# Patient Record
Sex: FEMALE | Race: WHITE | NOT HISPANIC OR LATINO
[De-identification: names, ages, dates, MRNs, and addresses within clinical notes are randomized per-mention and may not be internally consistent; named-entity substitution may affect disease eponyms.]

---

## 2017-01-12 ENCOUNTER — MEDICATION RENEWAL (OUTPATIENT)
Age: 67
End: 2017-01-12

## 2017-04-25 ENCOUNTER — FORM ENCOUNTER (OUTPATIENT)
Age: 67
End: 2017-04-25

## 2017-04-26 ENCOUNTER — APPOINTMENT (OUTPATIENT)
Dept: RADIOLOGY | Facility: CLINIC | Age: 67
End: 2017-04-26

## 2017-04-26 ENCOUNTER — OUTPATIENT (OUTPATIENT)
Dept: OUTPATIENT SERVICES | Facility: HOSPITAL | Age: 67
LOS: 1 days | End: 2017-04-26
Payer: COMMERCIAL

## 2017-04-26 DIAGNOSIS — Z00.8 ENCOUNTER FOR OTHER GENERAL EXAMINATION: ICD-10-CM

## 2017-04-26 PROCEDURE — 73502 X-RAY EXAM HIP UNI 2-3 VIEWS: CPT

## 2017-04-26 PROCEDURE — 77080 DXA BONE DENSITY AXIAL: CPT

## 2017-08-29 ENCOUNTER — RX RENEWAL (OUTPATIENT)
Age: 67
End: 2017-08-29

## 2017-09-17 ENCOUNTER — RX RENEWAL (OUTPATIENT)
Age: 67
End: 2017-09-17

## 2017-10-24 ENCOUNTER — FORM ENCOUNTER (OUTPATIENT)
Age: 67
End: 2017-10-24

## 2017-10-25 ENCOUNTER — APPOINTMENT (OUTPATIENT)
Dept: MRI IMAGING | Facility: CLINIC | Age: 67
End: 2017-10-25
Payer: COMMERCIAL

## 2017-10-25 ENCOUNTER — OUTPATIENT (OUTPATIENT)
Dept: OUTPATIENT SERVICES | Facility: HOSPITAL | Age: 67
LOS: 1 days | End: 2017-10-25
Payer: COMMERCIAL

## 2017-10-25 DIAGNOSIS — Z00.8 ENCOUNTER FOR OTHER GENERAL EXAMINATION: ICD-10-CM

## 2017-10-25 DIAGNOSIS — M54.12 RADICULOPATHY, CERVICAL REGION: ICD-10-CM

## 2017-10-25 PROCEDURE — 72141 MRI NECK SPINE W/O DYE: CPT | Mod: 26

## 2017-10-25 PROCEDURE — 72141 MRI NECK SPINE W/O DYE: CPT

## 2018-01-24 ENCOUNTER — FORM ENCOUNTER (OUTPATIENT)
Age: 68
End: 2018-01-24

## 2018-01-25 ENCOUNTER — OUTPATIENT (OUTPATIENT)
Dept: OUTPATIENT SERVICES | Facility: HOSPITAL | Age: 68
LOS: 1 days | End: 2018-01-25
Payer: COMMERCIAL

## 2018-01-25 ENCOUNTER — APPOINTMENT (OUTPATIENT)
Dept: MRI IMAGING | Facility: CLINIC | Age: 68
End: 2018-01-25
Payer: COMMERCIAL

## 2018-01-25 DIAGNOSIS — Z00.8 ENCOUNTER FOR OTHER GENERAL EXAMINATION: ICD-10-CM

## 2018-01-25 DIAGNOSIS — M25.561 PAIN IN RIGHT KNEE: ICD-10-CM

## 2018-01-25 PROCEDURE — 73721 MRI JNT OF LWR EXTRE W/O DYE: CPT | Mod: 26,RT

## 2018-01-25 PROCEDURE — 73721 MRI JNT OF LWR EXTRE W/O DYE: CPT

## 2018-01-26 ENCOUNTER — APPOINTMENT (OUTPATIENT)
Dept: FAMILY MEDICINE | Facility: CLINIC | Age: 68
End: 2018-01-26
Payer: COMMERCIAL

## 2018-01-26 VITALS
BODY MASS INDEX: 27.47 KG/M2 | HEART RATE: 64 BPM | OXYGEN SATURATION: 98 % | DIASTOLIC BLOOD PRESSURE: 68 MMHG | SYSTOLIC BLOOD PRESSURE: 110 MMHG | HEIGHT: 67 IN | RESPIRATION RATE: 16 BRPM | WEIGHT: 175 LBS

## 2018-01-26 DIAGNOSIS — Z87.2 PERSONAL HISTORY OF DISEASES OF THE SKIN AND SUBCUTANEOUS TISSUE: ICD-10-CM

## 2018-01-26 PROCEDURE — 20610 DRAIN/INJ JOINT/BURSA W/O US: CPT

## 2018-01-26 PROCEDURE — 99213 OFFICE O/P EST LOW 20 MIN: CPT | Mod: 25

## 2018-01-26 RX ADMIN — METHYLPREDNISOLONE ACETATE 0 MG/ML: 40 INJECTION, SUSPENSION INTRA-ARTICULAR; INTRALESIONAL; INTRAMUSCULAR; SOFT TISSUE at 00:00

## 2018-01-27 RX ORDER — METHYLPRED ACET/NACL,ISO-OS/PF 40 MG/ML
40 VIAL (ML) INJECTION
Qty: 1 | Refills: 0 | Status: COMPLETED | OUTPATIENT
Start: 2018-01-26

## 2018-06-05 ENCOUNTER — APPOINTMENT (OUTPATIENT)
Dept: FAMILY MEDICINE | Facility: CLINIC | Age: 68
End: 2018-06-05
Payer: COMMERCIAL

## 2018-06-05 VITALS — DIASTOLIC BLOOD PRESSURE: 84 MMHG | SYSTOLIC BLOOD PRESSURE: 138 MMHG | HEART RATE: 64 BPM

## 2018-06-05 DIAGNOSIS — M25.561 PAIN IN RIGHT KNEE: ICD-10-CM

## 2018-06-05 DIAGNOSIS — Z96.649 PRESENCE OF UNSPECIFIED ARTIFICIAL HIP JOINT: ICD-10-CM

## 2018-06-05 DIAGNOSIS — M16.10 UNILATERAL PRIMARY OSTEOARTHRITIS, UNSPECIFIED HIP: ICD-10-CM

## 2018-06-05 DIAGNOSIS — M25.461 EFFUSION, RIGHT KNEE: ICD-10-CM

## 2018-06-05 PROCEDURE — 36415 COLL VENOUS BLD VENIPUNCTURE: CPT

## 2018-06-05 PROCEDURE — 99397 PER PM REEVAL EST PAT 65+ YR: CPT | Mod: 25

## 2018-06-05 RX ORDER — MELOXICAM 15 MG/1
15 TABLET ORAL
Qty: 30 | Refills: 2 | Status: DISCONTINUED | COMMUNITY
Start: 2018-01-08 | End: 2018-06-05

## 2018-06-05 NOTE — HEALTH RISK ASSESSMENT
[Very Good] : ~his/her~  mood as very good [] : No [No falls in past year] : Patient reported no falls in the past year [0] : 2) Feeling down, depressed, or hopeless: Not at all (0) [de-identified] : occassionaly [HIV Test offered] : HIV Test offered [Hepatitis C test offered] : Hepatitis C test offered [Change in mental status noted] : No change in mental status noted [None] : None [With Family] : lives with family [College] : College [] :  [# Of Children ___] : has [unfilled] children [Sexually Active] : sexually active [High Risk Behavior] : no high risk behavior [Fully functional (bathing, dressing, toileting, transferring, walking, feeding)] : Fully functional (bathing, dressing, toileting, transferring, walking, feeding) [Fully functional (using the telephone, shopping, preparing meals, housekeeping, doing laundry, using] : Fully functional and needs no help or supervision to perform IADLs (using the telephone, shopping, preparing meals, housekeeping, doing laundry, using transportation, managing medications and managing finances) [Reports changes in hearing] : Reports no changes in hearing [Reports changes in vision] : Reports no changes in vision [MammogramDate] : 2016 [BoneDensityDate] : 2017 [ColonoscopyDate] : 2012 [Aggressive treatment] : aggressive treatment

## 2018-06-05 NOTE — PHYSICAL EXAM
[No Acute Distress] : no acute distress [Well-Appearing] : well-appearing [Normal Oropharynx] : the oropharynx was normal [No JVD] : no jugular venous distention [No Lymphadenopathy] : no lymphadenopathy [Clear to Auscultation] : lungs were clear to auscultation bilaterally [Regular Rhythm] : with a regular rhythm [No Murmur] : no murmur heard [No Carotid Bruits] : no carotid bruits [Pedal Pulses Present] : the pedal pulses are present [No Edema] : there was no peripheral edema [Soft] : abdomen soft [No Masses] : no abdominal mass palpated [No HSM] : no HSM [Normal Supraclavicular Nodes] : no supraclavicular lymphadenopathy [Normal Axillary Nodes] : no axillary lymphadenopathy [Normal Posterior Cervical Nodes] : no posterior cervical lymphadenopathy [Normal Anterior Cervical Nodes] : no anterior cervical lymphadenopathy [Normal Inguinal Nodes] : no inguinal lymphadenopathy [No Spinal Tenderness] : no spinal tenderness [Normal Gait] : normal gait [Normal Insight/Judgement] : insight and judgment were intact [de-identified] : mild DJD changes of both knees

## 2018-06-06 VITALS — WEIGHT: 173 LBS | HEIGHT: 67 IN | BODY MASS INDEX: 27.15 KG/M2

## 2018-06-06 LAB
25(OH)D3 SERPL-MCNC: 27.4 NG/ML
ALBUMIN SERPL ELPH-MCNC: 4.4 G/DL
ALP BLD-CCNC: 62 U/L
ALT SERPL-CCNC: 29 U/L
ANION GAP SERPL CALC-SCNC: 17 MMOL/L
AST SERPL-CCNC: 31 U/L
BASOPHILS # BLD AUTO: 0.03 K/UL
BASOPHILS NFR BLD AUTO: 0.5 %
BILIRUB SERPL-MCNC: 1.2 MG/DL
BUN SERPL-MCNC: 18 MG/DL
CALCIUM SERPL-MCNC: 10.1 MG/DL
CHLORIDE SERPL-SCNC: 100 MMOL/L
CHOLEST SERPL-MCNC: 235 MG/DL
CHOLEST/HDLC SERPL: 3.1 RATIO
CO2 SERPL-SCNC: 23 MMOL/L
CREAT SERPL-MCNC: 0.85 MG/DL
EOSINOPHIL # BLD AUTO: 0.1 K/UL
EOSINOPHIL NFR BLD AUTO: 1.7 %
GLUCOSE SERPL-MCNC: 71 MG/DL
HBA1C MFR BLD HPLC: 5.3 %
HCT VFR BLD CALC: 43.8 %
HCV AB SER QL: NONREACTIVE
HCV S/CO RATIO: 0.15 S/CO
HDLC SERPL-MCNC: 75 MG/DL
HGB BLD-MCNC: 14.3 G/DL
HIV1+2 AB SPEC QL IA.RAPID: NONREACTIVE
IMM GRANULOCYTES NFR BLD AUTO: 0.2 %
LDLC SERPL CALC-MCNC: 139 MG/DL
LYMPHOCYTES # BLD AUTO: 1.5 K/UL
LYMPHOCYTES NFR BLD AUTO: 25.4 %
MAN DIFF?: NORMAL
MCHC RBC-ENTMCNC: 31.8 PG
MCHC RBC-ENTMCNC: 32.6 GM/DL
MCV RBC AUTO: 97.3 FL
MONOCYTES # BLD AUTO: 0.59 K/UL
MONOCYTES NFR BLD AUTO: 10 %
NEUTROPHILS # BLD AUTO: 3.67 K/UL
NEUTROPHILS NFR BLD AUTO: 62.2 %
PLATELET # BLD AUTO: 358 K/UL
POTASSIUM SERPL-SCNC: 4.4 MMOL/L
PROT SERPL-MCNC: 7.4 G/DL
RBC # BLD: 4.5 M/UL
RBC # FLD: 13.1 %
SODIUM SERPL-SCNC: 140 MMOL/L
TRIGL SERPL-MCNC: 104 MG/DL
TSH SERPL-ACNC: 3.42 UIU/ML
WBC # FLD AUTO: 5.9 K/UL

## 2018-08-28 ENCOUNTER — RX RENEWAL (OUTPATIENT)
Age: 68
End: 2018-08-28

## 2018-09-13 VITALS
BODY MASS INDEX: 27.15 KG/M2 | OXYGEN SATURATION: 98 % | HEART RATE: 68 BPM | RESPIRATION RATE: 16 BRPM | HEIGHT: 67 IN | WEIGHT: 173 LBS | SYSTOLIC BLOOD PRESSURE: 136 MMHG | DIASTOLIC BLOOD PRESSURE: 82 MMHG

## 2018-09-13 NOTE — CONSULT LETTER
[Dear  ___] : Dear  [unfilled], [Consult Letter:] : I had the pleasure of evaluating your patient, [unfilled]. [Please see my note below.] : Please see my note below. [Consult Closing:] : Thank you very much for allowing me to participate in the care of this patient.  If you have any questions, please do not hesitate to contact me. [Sincerely,] : Sincerely,

## 2018-09-13 NOTE — HISTORY OF PRESENT ILLNESS
[No Pertinent Cardiac History] : no history of aortic stenosis, atrial fibrillation, coronary artery disease, recent myocardial infarction, or implantable device/pacemaker [Asthma] : asthma [No Adverse Anesthesia Reaction] : no adverse anesthesia reaction in self or family member [(Patient denies any chest pain, claudication, dyspnea on exertion, orthopnea, palpitations or syncope)] : Patient denies any chest pain, claudication, dyspnea on exertion, orthopnea, palpitations or syncope [COPD] : no COPD [Sleep Apnea] : no sleep apnea [Smoker] : not a smoker [Chronic Anticoagulation] : no chronic anticoagulation [Chronic Kidney Disease] : no chronic kidney disease [Diabetes] : no diabetes [FreeTextEntry1] : Superficial varicose vein ablation [FreeTextEntry2] : September 14 [FreeTextEntry3] : Bret Crain [FreeTextEntry4] : Patient has significant bilateral varicose veins, which are uncomfortable and sometimes painful\par \par Patient exercises regularly with a . No cardiovascular symptoms.\par \par She underwent total right hip replacement in 2014, which was uneventful.\par \par Very infrequently has mild asthma has not needed an inhaler in many months.\par \par Hypothyroidism controlled with l-thyroxine.\par \par Hypertension controlled with current medications

## 2018-09-13 NOTE — PHYSICAL EXAM
[No Acute Distress] : no acute distress [Well-Appearing] : well-appearing [No Lymphadenopathy] : no lymphadenopathy [Clear to Auscultation] : lungs were clear to auscultation bilaterally [Regular Rhythm] : with a regular rhythm [No Murmur] : no murmur heard [Soft] : abdomen soft [Normal Anterior Cervical Nodes] : no anterior cervical lymphadenopathy [No Rash] : no rash [Thyroid Normal, No Nodules] : the thyroid was normal and there were no nodules present [de-identified] : Varicose veins

## 2018-09-13 NOTE — ASSESSMENT
[Patient Optimized for Surgery] : Patient optimized for surgery [No Further Testing Recommended] : no further testing recommended [FreeTextEntry4] : Patient is medically stable for proposed procedure and is cleared

## 2018-10-10 ENCOUNTER — RX RENEWAL (OUTPATIENT)
Age: 68
End: 2018-10-10

## 2018-10-11 ENCOUNTER — RX CHANGE (OUTPATIENT)
Age: 68
End: 2018-10-11

## 2018-10-17 ENCOUNTER — RX RENEWAL (OUTPATIENT)
Age: 68
End: 2018-10-17

## 2019-01-04 ENCOUNTER — LABORATORY RESULT (OUTPATIENT)
Age: 69
End: 2019-01-04

## 2019-01-04 ENCOUNTER — APPOINTMENT (OUTPATIENT)
Dept: FAMILY MEDICINE | Facility: CLINIC | Age: 69
End: 2019-01-04
Payer: COMMERCIAL

## 2019-01-04 VITALS
HEART RATE: 104 BPM | WEIGHT: 173 LBS | HEIGHT: 67 IN | BODY MASS INDEX: 27.15 KG/M2 | TEMPERATURE: 99.9 F | DIASTOLIC BLOOD PRESSURE: 70 MMHG | RESPIRATION RATE: 16 BRPM | SYSTOLIC BLOOD PRESSURE: 130 MMHG | OXYGEN SATURATION: 95 %

## 2019-01-04 PROCEDURE — 99214 OFFICE O/P EST MOD 30 MIN: CPT | Mod: 25

## 2019-01-04 PROCEDURE — 81003 URINALYSIS AUTO W/O SCOPE: CPT | Mod: QW

## 2019-01-04 NOTE — HISTORY OF PRESENT ILLNESS
[FreeTextEntry8] : Several days of paroxysmal cough. In the past 2 days, shaking, chills and sweats with low grade fever. One episode of vomiting. No urinary frequency. Urinalysis shows positive nitrites, small amount of leukocytes, moderate amount of blood

## 2019-01-04 NOTE — REVIEW OF SYSTEMS
[Fever] : fever [Chills] : chills [Night Sweats] : night sweats [Wheezing] : wheezing [Cough] : cough [Vomiting] : vomiting [Dysuria] : dysuria [Muscle Pain] : muscle pain [Negative] : Cardiovascular [Abdominal Pain] : no abdominal pain

## 2019-01-04 NOTE — ASSESSMENT
[FreeTextEntry1] : Rapid flu negative.\par \par Shaking chills and sweats. Vital signs stable. Physical exam, normal possible UTI. Evaluate for sepsis blood and urine cultures. Start Bactrim DS b.i.d. empirically awaiting cultures

## 2019-01-04 NOTE — PHYSICAL EXAM
[Ill-Appearing] : ill-appearing [No Lymphadenopathy] : no lymphadenopathy [No Respiratory Distress] : no respiratory distress  [Clear to Auscultation] : lungs were clear to auscultation bilaterally [Regular Rhythm] : with a regular rhythm [No Murmur] : no murmur heard [No Carotid Bruits] : no carotid bruits [No Edema] : there was no peripheral edema [Soft] : abdomen soft [Non Tender] : non-tender [No HSM] : no HSM [No Rash] : no rash [de-identified] : No CVA tenderness

## 2019-01-06 LAB
ALBUMIN SERPL ELPH-MCNC: 3.8 G/DL
ALP BLD-CCNC: 82 U/L
ALT SERPL-CCNC: 26 U/L
ANION GAP SERPL CALC-SCNC: 12 MMOL/L
APPEARANCE: ABNORMAL
AST SERPL-CCNC: 21 U/L
BACTERIA UR CULT: ABNORMAL
BASOPHILS # BLD AUTO: 0.01 K/UL
BASOPHILS NFR BLD AUTO: 0.1 %
BILIRUB SERPL-MCNC: 1.8 MG/DL
BILIRUB UR QL STRIP: NORMAL
BILIRUBIN URINE: ABNORMAL
BLOOD URINE: ABNORMAL
BUN SERPL-MCNC: 14 MG/DL
CALCIUM SERPL-MCNC: 9.3 MG/DL
CHLORIDE SERPL-SCNC: 95 MMOL/L
CO2 SERPL-SCNC: 24 MMOL/L
COLOR: ABNORMAL
CREAT SERPL-MCNC: 0.74 MG/DL
EOSINOPHIL # BLD AUTO: 0.01 K/UL
EOSINOPHIL NFR BLD AUTO: 0.1 %
GLUCOSE QUALITATIVE U: NEGATIVE MG/DL
GLUCOSE SERPL-MCNC: 119 MG/DL
GLUCOSE UR-MCNC: NEGATIVE
HBA1C MFR BLD HPLC: 5.3 %
HCG UR QL: 2 EU/DL
HCT VFR BLD CALC: 37.7 %
HGB BLD-MCNC: 12.2 G/DL
HGB UR QL STRIP.AUTO: NORMAL
IMM GRANULOCYTES NFR BLD AUTO: 0.6 %
KETONES UR-MCNC: NEGATIVE
KETONES URINE: NEGATIVE
LEUKOCYTE ESTERASE UR QL STRIP: NORMAL
LEUKOCYTE ESTERASE URINE: ABNORMAL
LYMPHOCYTES # BLD AUTO: 1.08 K/UL
LYMPHOCYTES NFR BLD AUTO: 5.8 %
MAN DIFF?: NORMAL
MCHC RBC-ENTMCNC: 31.6 PG
MCHC RBC-ENTMCNC: 32.4 GM/DL
MCV RBC AUTO: 97.7 FL
MONOCYTES # BLD AUTO: 1.26 K/UL
MONOCYTES NFR BLD AUTO: 6.8 %
NEUTROPHILS # BLD AUTO: 16.01 K/UL
NEUTROPHILS NFR BLD AUTO: 86.6 %
NITRITE UR QL STRIP: POSITIVE
NITRITE URINE: POSITIVE
PH UR STRIP: 5.5
PH URINE: 5.5
PLATELET # BLD AUTO: 281 K/UL
POTASSIUM SERPL-SCNC: 3.2 MMOL/L
PROT SERPL-MCNC: 7.2 G/DL
PROT UR STRIP-MCNC: NORMAL
PROTEIN URINE: 100 MG/DL
RBC # BLD: 3.86 M/UL
RBC # FLD: 13.8 %
SODIUM SERPL-SCNC: 131 MMOL/L
SP GR UR STRIP: >=1.03
SPECIFIC GRAVITY URINE: 1.03
TSH SERPL-ACNC: 3.45 UIU/ML
UROBILINOGEN URINE: 1 MG/DL
WBC # FLD AUTO: 18.48 K/UL

## 2019-01-07 ENCOUNTER — LABORATORY RESULT (OUTPATIENT)
Age: 69
End: 2019-01-07

## 2019-01-07 ENCOUNTER — FORM ENCOUNTER (OUTPATIENT)
Age: 69
End: 2019-01-07

## 2019-01-07 ENCOUNTER — APPOINTMENT (OUTPATIENT)
Dept: FAMILY MEDICINE | Facility: CLINIC | Age: 69
End: 2019-01-07
Payer: COMMERCIAL

## 2019-01-07 LAB
APPEARANCE: CLEAR
BILIRUB UR QL STRIP: NORMAL
BILIRUBIN URINE: NEGATIVE
BLOOD URINE: ABNORMAL
COLOR: ABNORMAL
GLUCOSE QUALITATIVE U: NEGATIVE MG/DL
GLUCOSE UR-MCNC: NEGATIVE
HCG UR QL: 0.2 EU/DL
HGB UR QL STRIP.AUTO: NORMAL
KETONES UR-MCNC: NORMAL
KETONES URINE: ABNORMAL
LEUKOCYTE ESTERASE UR QL STRIP: NORMAL
LEUKOCYTE ESTERASE URINE: NEGATIVE
NITRITE UR QL STRIP: NEGATIVE
NITRITE URINE: NEGATIVE
PH UR STRIP: 6
PH URINE: 6
PROT UR STRIP-MCNC: 30
PROTEIN URINE: ABNORMAL MG/DL
SP GR UR STRIP: 1.02
SPECIFIC GRAVITY URINE: 1.02
UROBILINOGEN URINE: NEGATIVE MG/DL

## 2019-01-07 PROCEDURE — 81003 URINALYSIS AUTO W/O SCOPE: CPT | Mod: QW

## 2019-01-07 PROCEDURE — 99213 OFFICE O/P EST LOW 20 MIN: CPT | Mod: 25

## 2019-01-07 PROCEDURE — 36415 COLL VENOUS BLD VENIPUNCTURE: CPT

## 2019-01-07 NOTE — ASSESSMENT
[FreeTextEntry1] : Persistent night sweats, and cough Hematuria. Recheck labs  abdominal ultrasound and chest x-ray Continue levofloxacin

## 2019-01-07 NOTE — PHYSICAL EXAM
[No Acute Distress] : no acute distress [Supple] : supple [No Lymphadenopathy] : no lymphadenopathy [Clear to Auscultation] : lungs were clear to auscultation bilaterally [Regular Rhythm] : with a regular rhythm [No Murmur] : no murmur heard [Soft] : abdomen soft [No HSM] : no HSM [Normal Axillary Nodes] : no axillary lymphadenopathy [Normal Anterior Cervical Nodes] : no anterior cervical lymphadenopathy [Normal Inguinal Nodes] : no inguinal lymphadenopathy [No Rash] : no rash [de-identified] : Somewhat weak

## 2019-01-07 NOTE — HISTORY OF PRESENT ILLNESS
[de-identified] : Persistent dry cough with improving dyspnea on exertion. Persistent, but diminishing night sweats , microscopic hematuria with resolution of cystitis symptoms

## 2019-01-07 NOTE — REVIEW OF SYSTEMS
[Fever] : no fever [Chills] : chills [Fatigue] : fatigue [Night Sweats] : night sweats [Wheezing] : no wheezing [Cough] : cough [Dyspnea on Exertion] : dyspnea on exertion [Negative] : Genitourinary

## 2019-01-08 ENCOUNTER — APPOINTMENT (OUTPATIENT)
Dept: ULTRASOUND IMAGING | Facility: CLINIC | Age: 69
End: 2019-01-08
Payer: COMMERCIAL

## 2019-01-08 ENCOUNTER — OUTPATIENT (OUTPATIENT)
Dept: OUTPATIENT SERVICES | Facility: HOSPITAL | Age: 69
LOS: 1 days | End: 2019-01-08
Payer: COMMERCIAL

## 2019-01-08 ENCOUNTER — APPOINTMENT (OUTPATIENT)
Dept: RADIOLOGY | Facility: CLINIC | Age: 69
End: 2019-01-08
Payer: COMMERCIAL

## 2019-01-08 DIAGNOSIS — R31.9 HEMATURIA, UNSPECIFIED: ICD-10-CM

## 2019-01-08 DIAGNOSIS — J45.901 UNSPECIFIED ASTHMA WITH (ACUTE) EXACERBATION: ICD-10-CM

## 2019-01-08 DIAGNOSIS — Z00.8 ENCOUNTER FOR OTHER GENERAL EXAMINATION: ICD-10-CM

## 2019-01-08 DIAGNOSIS — D72.9 DISORDER OF WHITE BLOOD CELLS, UNSPECIFIED: ICD-10-CM

## 2019-01-08 LAB
ALBUMIN SERPL ELPH-MCNC: 3.5 G/DL
ALP BLD-CCNC: 71 U/L
ALT SERPL-CCNC: 31 U/L
ANION GAP SERPL CALC-SCNC: 12 MMOL/L
AST SERPL-CCNC: 28 U/L
BASOPHILS # BLD AUTO: 0.14 K/UL
BASOPHILS NFR BLD AUTO: 1.7 %
BILIRUB SERPL-MCNC: 0.8 MG/DL
BUN SERPL-MCNC: 13 MG/DL
CALCIUM SERPL-MCNC: 9.5 MG/DL
CHLORIDE SERPL-SCNC: 101 MMOL/L
CO2 SERPL-SCNC: 25 MMOL/L
CREAT SERPL-MCNC: 0.78 MG/DL
CRP SERPL-MCNC: 10.7 MG/DL
EOSINOPHIL # BLD AUTO: 0 K/UL
EOSINOPHIL NFR BLD AUTO: 0 %
ERYTHROCYTE [SEDIMENTATION RATE] IN BLOOD BY WESTERGREN METHOD: 67 MM/HR
GLUCOSE SERPL-MCNC: 81 MG/DL
HCT VFR BLD CALC: 36.9 %
HGB BLD-MCNC: 12.1 G/DL
LDH SERPL-CCNC: 316 U/L
LYMPHOCYTES # BLD AUTO: 0.76 K/UL
LYMPHOCYTES NFR BLD AUTO: 9.2 %
MAN DIFF?: NORMAL
MCHC RBC-ENTMCNC: 30.9 PG
MCHC RBC-ENTMCNC: 32.8 GM/DL
MCV RBC AUTO: 94.4 FL
MONOCYTES # BLD AUTO: 0.62 K/UL
MONOCYTES NFR BLD AUTO: 7.5 %
NEUTROPHILS # BLD AUTO: 6.41 K/UL
NEUTROPHILS NFR BLD AUTO: 77.4 %
PLATELET # BLD AUTO: 382 K/UL
POTASSIUM SERPL-SCNC: 3.6 MMOL/L
PROT SERPL-MCNC: 7.2 G/DL
RBC # BLD: 3.91 M/UL
RBC # FLD: 13.5 %
SODIUM SERPL-SCNC: 138 MMOL/L
WBC # FLD AUTO: 8.28 K/UL

## 2019-01-08 PROCEDURE — 71046 X-RAY EXAM CHEST 2 VIEWS: CPT

## 2019-01-08 PROCEDURE — 71046 X-RAY EXAM CHEST 2 VIEWS: CPT | Mod: 26

## 2019-01-08 PROCEDURE — 76700 US EXAM ABDOM COMPLETE: CPT

## 2019-01-08 PROCEDURE — 76700 US EXAM ABDOM COMPLETE: CPT | Mod: 26

## 2019-01-09 LAB — BACTERIA BLD CULT: NORMAL

## 2019-01-20 DIAGNOSIS — M19.90 UNSPECIFIED OSTEOARTHRITIS, UNSPECIFIED SITE: ICD-10-CM

## 2019-01-30 PROBLEM — M19.90 ARTHRITIS: Status: ACTIVE | Noted: 2019-01-30

## 2019-02-08 ENCOUNTER — APPOINTMENT (OUTPATIENT)
Dept: FAMILY MEDICINE | Facility: CLINIC | Age: 69
End: 2019-02-08
Payer: COMMERCIAL

## 2019-02-08 PROCEDURE — 90750 HZV VACC RECOMBINANT IM: CPT

## 2019-02-08 PROCEDURE — 90471 IMMUNIZATION ADMIN: CPT

## 2019-02-19 ENCOUNTER — RX RENEWAL (OUTPATIENT)
Age: 69
End: 2019-02-19

## 2019-04-01 ENCOUNTER — RX RENEWAL (OUTPATIENT)
Age: 69
End: 2019-04-01

## 2019-05-22 ENCOUNTER — RX RENEWAL (OUTPATIENT)
Age: 69
End: 2019-05-22

## 2019-07-08 RX ORDER — HYDROCODONE BITARTRATE AND HOMATROPINE METHYLBROMIDE 5; 1.5 MG/5ML; MG/5ML
5-1.5 SYRUP ORAL
Qty: 240 | Refills: 0 | Status: DISCONTINUED | COMMUNITY
Start: 2019-01-01 | End: 2019-07-08

## 2019-07-08 RX ORDER — LEVOFLOXACIN 750 MG/1
750 TABLET, FILM COATED ORAL DAILY
Qty: 10 | Refills: 0 | Status: DISCONTINUED | COMMUNITY
Start: 2019-01-04 | End: 2019-07-08

## 2019-07-08 RX ORDER — AZITHROMYCIN 250 MG/1
250 TABLET, FILM COATED ORAL
Qty: 1 | Refills: 0 | Status: DISCONTINUED | COMMUNITY
Start: 2019-01-03 | End: 2019-07-08

## 2019-08-06 ENCOUNTER — RX RENEWAL (OUTPATIENT)
Age: 69
End: 2019-08-06

## 2019-10-14 ENCOUNTER — RX RENEWAL (OUTPATIENT)
Age: 69
End: 2019-10-14

## 2020-05-28 ENCOUNTER — NON-APPOINTMENT (OUTPATIENT)
Age: 70
End: 2020-05-28

## 2020-08-15 ENCOUNTER — APPOINTMENT (OUTPATIENT)
Dept: FAMILY MEDICINE | Facility: CLINIC | Age: 70
End: 2020-08-15
Payer: COMMERCIAL

## 2020-08-15 ENCOUNTER — LABORATORY RESULT (OUTPATIENT)
Age: 70
End: 2020-08-15

## 2020-08-15 ENCOUNTER — NON-APPOINTMENT (OUTPATIENT)
Age: 70
End: 2020-08-15

## 2020-08-15 VITALS
WEIGHT: 176 LBS | DIASTOLIC BLOOD PRESSURE: 70 MMHG | OXYGEN SATURATION: 98 % | RESPIRATION RATE: 16 BRPM | BODY MASS INDEX: 27.62 KG/M2 | SYSTOLIC BLOOD PRESSURE: 130 MMHG | HEIGHT: 67 IN | HEART RATE: 73 BPM

## 2020-08-15 DIAGNOSIS — Z87.898 PERSONAL HISTORY OF OTHER SPECIFIED CONDITIONS: ICD-10-CM

## 2020-08-15 DIAGNOSIS — J45.901 UNSPECIFIED ASTHMA WITH (ACUTE) EXACERBATION: ICD-10-CM

## 2020-08-15 DIAGNOSIS — N39.0 URINARY TRACT INFECTION, SITE NOT SPECIFIED: ICD-10-CM

## 2020-08-15 DIAGNOSIS — L98.8 OTHER SPECIFIED DISORDERS OF THE SKIN AND SUBCUTANEOUS TISSUE: ICD-10-CM

## 2020-08-15 DIAGNOSIS — Z87.09 PERSONAL HISTORY OF OTHER DISEASES OF THE RESPIRATORY SYSTEM: ICD-10-CM

## 2020-08-15 DIAGNOSIS — D72.9 DISORDER OF WHITE BLOOD CELLS, UNSPECIFIED: ICD-10-CM

## 2020-08-15 DIAGNOSIS — R31.9 HEMATURIA, UNSPECIFIED: ICD-10-CM

## 2020-08-15 DIAGNOSIS — H00.13 CHALAZION RIGHT EYE, UNSPECIFIED EYELID: ICD-10-CM

## 2020-08-15 PROCEDURE — 99214 OFFICE O/P EST MOD 30 MIN: CPT | Mod: 25

## 2020-08-15 PROCEDURE — 93000 ELECTROCARDIOGRAM COMPLETE: CPT

## 2020-08-15 RX ORDER — HYDROCODONE BITARTRATE AND HOMATROPINE METHYLBROMIDE 5; 1.5 MG/5ML; MG/5ML
5-1.5 SYRUP ORAL
Qty: 240 | Refills: 0 | Status: DISCONTINUED | COMMUNITY
Start: 2020-01-19 | End: 2020-08-15

## 2020-08-15 RX ORDER — PHENAZOPYRIDINE 200 MG/1
200 TABLET, FILM COATED ORAL 3 TIMES DAILY
Qty: 15 | Refills: 1 | Status: DISCONTINUED | COMMUNITY
Start: 2020-06-29 | End: 2020-08-15

## 2020-08-15 RX ORDER — CELECOXIB 200 MG/1
200 CAPSULE ORAL
Qty: 60 | Refills: 1 | Status: DISCONTINUED | COMMUNITY
Start: 2020-06-29 | End: 2020-08-15

## 2020-08-15 RX ORDER — BIMATOPROST 0.3 MG/ML
0.03 SOLUTION/ DROPS OPHTHALMIC
Qty: 1 | Refills: 3 | Status: DISCONTINUED | COMMUNITY
Start: 2019-03-31 | End: 2020-08-15

## 2020-08-15 RX ORDER — TOBRAMYCIN AND DEXAMETHASONE 3; 1 MG/G; MG/G
0.3-0.1 OINTMENT OPHTHALMIC TWICE DAILY
Qty: 1 | Refills: 3 | Status: DISCONTINUED | COMMUNITY
Start: 2019-09-19 | End: 2020-08-15

## 2020-08-15 RX ORDER — DICLOFENAC SODIUM 10 MG/G
1 GEL TOPICAL DAILY
Qty: 1 | Refills: 3 | Status: DISCONTINUED | COMMUNITY
Start: 2019-01-30 | End: 2020-08-15

## 2020-08-15 RX ORDER — SULFAMETHOXAZOLE AND TRIMETHOPRIM 800; 160 MG/1; MG/1
800-160 TABLET ORAL TWICE DAILY
Qty: 20 | Refills: 1 | Status: DISCONTINUED | COMMUNITY
Start: 2020-06-29 | End: 2020-08-15

## 2020-08-17 LAB
25(OH)D3 SERPL-MCNC: 31.4 NG/ML
ALBUMIN SERPL ELPH-MCNC: 4.4 G/DL
ALP BLD-CCNC: 62 U/L
ALT SERPL-CCNC: 25 U/L
ANION GAP SERPL CALC-SCNC: 13 MMOL/L
AST SERPL-CCNC: 24 U/L
BASOPHILS # BLD AUTO: 0.04 K/UL
BASOPHILS NFR BLD AUTO: 0.6 %
BILIRUB SERPL-MCNC: 1 MG/DL
BUN SERPL-MCNC: 18 MG/DL
CALCIUM SERPL-MCNC: 9.6 MG/DL
CHLORIDE SERPL-SCNC: 102 MMOL/L
CHOLEST SERPL-MCNC: 235 MG/DL
CHOLEST/HDLC SERPL: 2.4 RATIO
CO2 SERPL-SCNC: 25 MMOL/L
CREAT SERPL-MCNC: 0.69 MG/DL
EOSINOPHIL # BLD AUTO: 0.17 K/UL
EOSINOPHIL NFR BLD AUTO: 2.7 %
ESTIMATED AVERAGE GLUCOSE: 103 MG/DL
GLUCOSE SERPL-MCNC: 86 MG/DL
HBA1C MFR BLD HPLC: 5.2 %
HCT VFR BLD CALC: 40.1 %
HDLC SERPL-MCNC: 96 MG/DL
HGB BLD-MCNC: 13.2 G/DL
IMM GRANULOCYTES NFR BLD AUTO: 0.5 %
LDLC SERPL CALC-MCNC: 120 MG/DL
LYMPHOCYTES # BLD AUTO: 1.47 K/UL
LYMPHOCYTES NFR BLD AUTO: 23.7 %
MAN DIFF?: NORMAL
MCHC RBC-ENTMCNC: 32.3 PG
MCHC RBC-ENTMCNC: 32.9 GM/DL
MCV RBC AUTO: 98 FL
MONOCYTES # BLD AUTO: 0.76 K/UL
MONOCYTES NFR BLD AUTO: 12.3 %
NEUTROPHILS # BLD AUTO: 3.73 K/UL
NEUTROPHILS NFR BLD AUTO: 60.2 %
PLATELET # BLD AUTO: 280 K/UL
POTASSIUM SERPL-SCNC: 4.3 MMOL/L
PROT SERPL-MCNC: 6.5 G/DL
RBC # BLD: 4.09 M/UL
RBC # FLD: 13.2 %
SARS-COV-2 N GENE NPH QL NAA+PROBE: NOT DETECTED
SODIUM SERPL-SCNC: 140 MMOL/L
TRIGL SERPL-MCNC: 95 MG/DL
TSH SERPL-ACNC: 5.11 UIU/ML
WBC # FLD AUTO: 6.2 K/UL

## 2020-08-17 NOTE — HISTORY OF PRESENT ILLNESS
[No Pertinent Cardiac History] : no history of aortic stenosis, atrial fibrillation, coronary artery disease, recent myocardial infarction, or implantable device/pacemaker [No Adverse Anesthesia Reaction] : no adverse anesthesia reaction in self or family member [No Pertinent Pulmonary History] : no history of asthma, COPD, sleep apnea, or smoking [(Patient denies any chest pain, claudication, dyspnea on exertion, orthopnea, palpitations or syncope)] : Patient denies any chest pain, claudication, dyspnea on exertion, orthopnea, palpitations or syncope [Chronic Kidney Disease] : no chronic kidney disease [Chronic Anticoagulation] : no chronic anticoagulation [FreeTextEntry2] : August 19 [Diabetes] : no diabetes [FreeTextEntry1] : Open reduction internal fixation right olecranon process [FreeTextEntry3] : Dr. Schmidt [FreeTextEntry4] : Patient was knocked over by dogs on Tuesday, August 13.  Fracture of olecranon process and nasal bones.  No loss of consciousness\par \par Patient has no cardiovascular symptoms with exertion she exercises on ellipse machine for 1/2-hour without problem.  Denies palpitations chest pain or shortness of breath\par \par Hypertension well-controlled on current meds hypothyroidism controlled on levothyroxine\par \par Depression is controlled with Lexapro 20 mg daily.  Uses Xanax occasionally for anxiety or sleep\par \par Patient had recurrence of oral herpes approximately 3 weeks ago she has fully healed\par \par Uses Proventil inhaler rarely.  Has not required it in several months\par \par Had total hip replacement approximately 8 years ago which was uneventful.  She also had wrist fracture requiring open reduction internal fixation proximately 6 years ago

## 2020-08-17 NOTE — ASSESSMENT
[Patient Optimized for Surgery] : Patient optimized for surgery [No Further Testing Recommended] : no further testing recommended [FreeTextEntry4] : Covid antigen test pending

## 2020-08-17 NOTE — PHYSICAL EXAM
[Normal Rate] : normal rate  [Regular Rhythm] : with a regular rhythm [Normal S1, S2] : normal S1 and S2 [Normal] : no rash [de-identified] : Right arm is in a cast. [de-identified] : 1/6 systolic ejection murmur [de-identified] : Excoriation of bridge of nose

## 2020-09-30 ENCOUNTER — APPOINTMENT (OUTPATIENT)
Dept: FAMILY MEDICINE | Facility: CLINIC | Age: 70
End: 2020-09-30
Payer: COMMERCIAL

## 2020-09-30 PROCEDURE — 90662 IIV NO PRSV INCREASED AG IM: CPT

## 2020-09-30 PROCEDURE — G0008: CPT

## 2020-10-28 ENCOUNTER — RX RENEWAL (OUTPATIENT)
Age: 70
End: 2020-10-28

## 2021-01-13 ENCOUNTER — APPOINTMENT (OUTPATIENT)
Dept: FAMILY MEDICINE | Facility: CLINIC | Age: 71
End: 2021-01-13
Payer: COMMERCIAL

## 2021-01-13 PROCEDURE — 99072 ADDL SUPL MATRL&STAF TM PHE: CPT

## 2021-01-13 PROCEDURE — 99211 OFF/OP EST MAY X REQ PHY/QHP: CPT

## 2021-01-14 LAB — SARS-COV-2 N GENE NPH QL NAA+PROBE: NOT DETECTED

## 2021-08-11 ENCOUNTER — OUTPATIENT (OUTPATIENT)
Dept: OUTPATIENT SERVICES | Facility: HOSPITAL | Age: 71
LOS: 1 days | End: 2021-08-11
Payer: COMMERCIAL

## 2021-08-11 ENCOUNTER — APPOINTMENT (OUTPATIENT)
Dept: RADIOLOGY | Facility: CLINIC | Age: 71
End: 2021-08-11
Payer: COMMERCIAL

## 2021-08-11 DIAGNOSIS — M25.552 PAIN IN LEFT HIP: ICD-10-CM

## 2021-08-11 PROCEDURE — 73502 X-RAY EXAM HIP UNI 2-3 VIEWS: CPT | Mod: 26,LT

## 2021-08-11 PROCEDURE — 73502 X-RAY EXAM HIP UNI 2-3 VIEWS: CPT

## 2021-08-31 NOTE — HISTORY OF PRESENT ILLNESS
I performed a brief history of the patient here in triage and I have determined that pt will need further treatment and evaluation from the main side ER physician or [facility]. I have placed initial orders based on the history to help in expediting patients care.        Visit Vitals  BP (!) 135/98 (BP 1 Location: Left upper arm, BP Patient Position: At rest)   Pulse 91   Temp 99.2 °F (37.3 °C)   Resp 16   Ht 4' 11\" (1.499 m)   Wt 59 kg (130 lb)   SpO2 99%   BMI 26.26 kg/m²      LEE Mtz 7:54 PM [de-identified] : here for check up\par HTN and hypothyroid contolled on meds\par Regular exercise w/o CV sxs\par Dieting

## 2021-09-24 ENCOUNTER — APPOINTMENT (OUTPATIENT)
Dept: FAMILY MEDICINE | Facility: CLINIC | Age: 71
End: 2021-09-24

## 2021-10-18 ENCOUNTER — RX RENEWAL (OUTPATIENT)
Age: 71
End: 2021-10-18

## 2021-10-19 ENCOUNTER — RX RENEWAL (OUTPATIENT)
Age: 71
End: 2021-10-19

## 2021-11-10 ENCOUNTER — APPOINTMENT (OUTPATIENT)
Dept: FAMILY MEDICINE | Facility: CLINIC | Age: 71
End: 2021-11-10
Payer: COMMERCIAL

## 2021-11-10 ENCOUNTER — LABORATORY RESULT (OUTPATIENT)
Age: 71
End: 2021-11-10

## 2021-11-10 PROCEDURE — 99214 OFFICE O/P EST MOD 30 MIN: CPT | Mod: 25

## 2021-11-10 PROCEDURE — 36415 COLL VENOUS BLD VENIPUNCTURE: CPT

## 2021-11-10 NOTE — HISTORY OF PRESENT ILLNESS
[FreeTextEntry8] : 10 days of persistent paroxysmal cough. No significant dyspnea and exertion, now beginning to wheeze. No fever, history of asthma has been vaccinated against covid. Has received a flu shot\par \par No apparent distress Pulse 88, respiratory rate 16. Chest somewhat decreased breath sounds slight wheeze\par \par Begin prednisone 40 mg daily for 5 days. Proventil inhaler Z-Kyler cough suppressant Trelegy\par Due for routine blood work

## 2021-11-10 NOTE — PHYSICAL EXAM
[Normal] : normal rate, regular rhythm, normal S1 and S2 and no murmur heard [de-identified] : See H&P

## 2021-11-12 LAB
25(OH)D3 SERPL-MCNC: 28.5 NG/ML
ALBUMIN SERPL ELPH-MCNC: 4.4 G/DL
ALP BLD-CCNC: 70 U/L
ALT SERPL-CCNC: 42 U/L
ANION GAP SERPL CALC-SCNC: 13 MMOL/L
AST SERPL-CCNC: 28 U/L
BASOPHILS # BLD AUTO: 0.04 K/UL
BASOPHILS NFR BLD AUTO: 0.8 %
BILIRUB SERPL-MCNC: 0.8 MG/DL
BUN SERPL-MCNC: 13 MG/DL
CALCIUM SERPL-MCNC: 9.5 MG/DL
CHLORIDE SERPL-SCNC: 102 MMOL/L
CO2 SERPL-SCNC: 27 MMOL/L
CREAT SERPL-MCNC: 0.88 MG/DL
EOSINOPHIL # BLD AUTO: 0.29 K/UL
EOSINOPHIL NFR BLD AUTO: 5.5 %
ESTIMATED AVERAGE GLUCOSE: 105 MG/DL
GLUCOSE SERPL-MCNC: 94 MG/DL
HBA1C MFR BLD HPLC: 5.3 %
HCT VFR BLD CALC: 41.4 %
HGB BLD-MCNC: 13.4 G/DL
IMM GRANULOCYTES NFR BLD AUTO: 0.4 %
LYMPHOCYTES # BLD AUTO: 1.52 K/UL
LYMPHOCYTES NFR BLD AUTO: 28.7 %
MAN DIFF?: NORMAL
MCHC RBC-ENTMCNC: 31.9 PG
MCHC RBC-ENTMCNC: 32.4 GM/DL
MCV RBC AUTO: 98.6 FL
MONOCYTES # BLD AUTO: 0.87 K/UL
MONOCYTES NFR BLD AUTO: 16.4 %
NEUTROPHILS # BLD AUTO: 2.55 K/UL
NEUTROPHILS NFR BLD AUTO: 48.2 %
PLATELET # BLD AUTO: 297 K/UL
POTASSIUM SERPL-SCNC: 3.8 MMOL/L
PROT SERPL-MCNC: 6.7 G/DL
RBC # BLD: 4.2 M/UL
RBC # FLD: 12.5 %
SARS-COV-2 N GENE NPH QL NAA+PROBE: NOT DETECTED
SODIUM SERPL-SCNC: 142 MMOL/L
TSH SERPL-ACNC: 7.22 UIU/ML
WBC # FLD AUTO: 5.29 K/UL

## 2021-11-13 ENCOUNTER — APPOINTMENT (OUTPATIENT)
Dept: FAMILY MEDICINE | Facility: CLINIC | Age: 71
End: 2021-11-13

## 2021-11-29 VITALS — BODY MASS INDEX: 29.03 KG/M2 | WEIGHT: 185 LBS | HEIGHT: 67 IN

## 2021-12-01 ENCOUNTER — APPOINTMENT (OUTPATIENT)
Dept: FAMILY MEDICINE | Facility: CLINIC | Age: 71
End: 2021-12-01
Payer: COMMERCIAL

## 2021-12-01 PROCEDURE — 0003A: CPT

## 2022-01-05 DIAGNOSIS — S73.192A OTHER SPRAIN OF LEFT HIP, INITIAL ENCOUNTER: ICD-10-CM

## 2022-01-14 ENCOUNTER — OUTPATIENT (OUTPATIENT)
Dept: OUTPATIENT SERVICES | Facility: HOSPITAL | Age: 72
LOS: 1 days | End: 2022-01-14
Payer: COMMERCIAL

## 2022-01-14 ENCOUNTER — APPOINTMENT (OUTPATIENT)
Dept: MRI IMAGING | Facility: CLINIC | Age: 72
End: 2022-01-14
Payer: COMMERCIAL

## 2022-01-14 DIAGNOSIS — S73.192A OTHER SPRAIN OF LEFT HIP, INITIAL ENCOUNTER: ICD-10-CM

## 2022-01-14 DIAGNOSIS — M25.552 PAIN IN LEFT HIP: ICD-10-CM

## 2022-01-14 DIAGNOSIS — Z00.8 ENCOUNTER FOR OTHER GENERAL EXAMINATION: ICD-10-CM

## 2022-01-14 PROCEDURE — 73721 MRI JNT OF LWR EXTRE W/O DYE: CPT | Mod: 26,LT

## 2022-01-14 PROCEDURE — 73721 MRI JNT OF LWR EXTRE W/O DYE: CPT

## 2022-01-17 RX ORDER — PREDNISONE 20 MG/1
20 TABLET ORAL
Qty: 20 | Refills: 1 | Status: DISCONTINUED | COMMUNITY
Start: 2021-11-10 | End: 2022-01-17

## 2022-01-17 RX ORDER — AZITHROMYCIN 250 MG/1
250 TABLET, FILM COATED ORAL
Qty: 1 | Refills: 0 | Status: DISCONTINUED | COMMUNITY
Start: 2021-11-10 | End: 2022-01-17

## 2022-02-01 ENCOUNTER — APPOINTMENT (OUTPATIENT)
Dept: ULTRASOUND IMAGING | Facility: CLINIC | Age: 72
End: 2022-02-01

## 2022-02-01 ENCOUNTER — OUTPATIENT (OUTPATIENT)
Dept: OUTPATIENT SERVICES | Facility: HOSPITAL | Age: 72
LOS: 1 days | End: 2022-02-01

## 2022-02-01 DIAGNOSIS — S73.192A OTHER SPRAIN OF LEFT HIP, INITIAL ENCOUNTER: ICD-10-CM

## 2022-02-17 ENCOUNTER — OUTPATIENT (OUTPATIENT)
Dept: OUTPATIENT SERVICES | Facility: HOSPITAL | Age: 72
LOS: 1 days | End: 2022-02-17
Payer: COMMERCIAL

## 2022-02-17 PROCEDURE — 93010 ELECTROCARDIOGRAM REPORT: CPT

## 2022-02-18 DIAGNOSIS — Z01.810 ENCOUNTER FOR PREPROCEDURAL CARDIOVASCULAR EXAMINATION: ICD-10-CM

## 2022-02-18 DIAGNOSIS — Z01.812 ENCOUNTER FOR PREPROCEDURAL LABORATORY EXAMINATION: ICD-10-CM

## 2022-02-18 DIAGNOSIS — M16.12 UNILATERAL PRIMARY OSTEOARTHRITIS, LEFT HIP: ICD-10-CM

## 2022-02-24 ENCOUNTER — RX RENEWAL (OUTPATIENT)
Age: 72
End: 2022-02-24

## 2022-03-03 RX ORDER — ALPRAZOLAM 0.5 MG/1
0.5 TABLET ORAL
Qty: 60 | Refills: 0 | Status: ACTIVE | COMMUNITY
Start: 2017-01-12 | End: 1900-01-01

## 2022-03-04 ENCOUNTER — APPOINTMENT (OUTPATIENT)
Dept: FAMILY MEDICINE | Facility: CLINIC | Age: 72
End: 2022-03-04
Payer: COMMERCIAL

## 2022-03-04 VITALS
HEIGHT: 67 IN | SYSTOLIC BLOOD PRESSURE: 128 MMHG | BODY MASS INDEX: 26.68 KG/M2 | DIASTOLIC BLOOD PRESSURE: 84 MMHG | OXYGEN SATURATION: 97 % | HEART RATE: 76 BPM | WEIGHT: 170 LBS

## 2022-03-04 PROCEDURE — 99214 OFFICE O/P EST MOD 30 MIN: CPT

## 2022-03-04 RX ORDER — GUAIFENESIN AND CODEINE PHOSPHATE 10; 100 MG/5ML; MG/5ML
100-10 SOLUTION ORAL
Qty: 240 | Refills: 0 | Status: DISCONTINUED | COMMUNITY
Start: 2021-11-10 | End: 2022-03-04

## 2022-03-04 RX ORDER — SULFAMETHOXAZOLE AND TRIMETHOPRIM 800; 160 MG/1; MG/1
800-160 TABLET ORAL
Qty: 20 | Refills: 3 | Status: DISCONTINUED | COMMUNITY
Start: 2022-01-17 | End: 2022-03-04

## 2022-03-04 NOTE — HISTORY OF PRESENT ILLNESS
[No Pertinent Cardiac History] : no history of aortic stenosis, atrial fibrillation, coronary artery disease, recent myocardial infarction, or implantable device/pacemaker [Asthma] : asthma [No Adverse Anesthesia Reaction] : no adverse anesthesia reaction in self or family member [(Patient denies any chest pain, claudication, dyspnea on exertion, orthopnea, palpitations or syncope)] : Patient denies any chest pain, claudication, dyspnea on exertion, orthopnea, palpitations or syncope [Chronic Anticoagulation] : no chronic anticoagulation [Chronic Kidney Disease] : no chronic kidney disease [Diabetes] : no diabetes [FreeTextEntry1] : Left total hip replacement [FreeTextEntry2] : March 10 [FreeTextEntry3] : Dr. Vivas [FreeTextEntry4] : Patient has significant hip pain MRI shows loss of cartilage labral tear loose body\par \par Patient had right total hip replacement approximately 7 years ago which was uneventful\par \par Her hypertension is well controlled on current meds\par \par No cardiovascular symptoms with exertion\par \par Rarely if ever uses her bronchodilators for asthma

## 2022-03-04 NOTE — ASSESSMENT
[Patient Optimized for Surgery] : Patient optimized for surgery [No Further Testing Recommended] : no further testing recommended [FreeTextEntry4] : Patient will obtain Covid PCR test tomorrow, 3/5  as approved by presurgical testing

## 2022-03-04 NOTE — PHYSICAL EXAM
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

## 2022-03-06 LAB — SARS-COV-2 N GENE NPH QL NAA+PROBE: DETECTED

## 2022-03-23 ENCOUNTER — OUTPATIENT (OUTPATIENT)
Dept: OUTPATIENT SERVICES | Facility: HOSPITAL | Age: 72
LOS: 1 days | End: 2022-03-23

## 2022-03-23 ENCOUNTER — INPATIENT (INPATIENT)
Facility: HOSPITAL | Age: 72
LOS: 0 days | Discharge: HOME HEALTH | End: 2022-03-24
Payer: COMMERCIAL

## 2022-03-23 PROCEDURE — 88311 DECALCIFY TISSUE: CPT | Mod: 26

## 2022-03-23 PROCEDURE — 88305 TISSUE EXAM BY PATHOLOGIST: CPT | Mod: 26

## 2022-03-24 ENCOUNTER — OUTPATIENT (OUTPATIENT)
Dept: OUTPATIENT SERVICES | Facility: HOSPITAL | Age: 72
LOS: 1 days | End: 2022-03-24

## 2022-03-25 ENCOUNTER — NON-APPOINTMENT (OUTPATIENT)
Age: 72
End: 2022-03-25

## 2022-03-30 DIAGNOSIS — F32.9 MAJOR DEPRESSIVE DISORDER, SINGLE EPISODE, UNSPECIFIED: ICD-10-CM

## 2022-03-30 DIAGNOSIS — I10 ESSENTIAL (PRIMARY) HYPERTENSION: ICD-10-CM

## 2022-03-30 DIAGNOSIS — Z87.891 PERSONAL HISTORY OF NICOTINE DEPENDENCE: ICD-10-CM

## 2022-03-30 DIAGNOSIS — Z96.641 PRESENCE OF RIGHT ARTIFICIAL HIP JOINT: ICD-10-CM

## 2022-03-30 DIAGNOSIS — D62 ACUTE POSTHEMORRHAGIC ANEMIA: ICD-10-CM

## 2022-03-30 DIAGNOSIS — M16.12 UNILATERAL PRIMARY OSTEOARTHRITIS, LEFT HIP: ICD-10-CM

## 2022-03-30 DIAGNOSIS — E03.9 HYPOTHYROIDISM, UNSPECIFIED: ICD-10-CM

## 2022-03-30 DIAGNOSIS — F41.9 ANXIETY DISORDER, UNSPECIFIED: ICD-10-CM

## 2022-04-07 DIAGNOSIS — F32.A DEPRESSION, UNSPECIFIED: ICD-10-CM

## 2022-04-11 DIAGNOSIS — Z96.642 PRESENCE OF LEFT ARTIFICIAL HIP JOINT: ICD-10-CM

## 2022-04-11 DIAGNOSIS — E78.5 HYPERLIPIDEMIA, UNSPECIFIED: ICD-10-CM

## 2022-04-11 DIAGNOSIS — I10 ESSENTIAL (PRIMARY) HYPERTENSION: ICD-10-CM

## 2022-04-13 DIAGNOSIS — E03.9 HYPOTHYROIDISM, UNSPECIFIED: ICD-10-CM

## 2022-04-13 DIAGNOSIS — Z96.642 PRESENCE OF LEFT ARTIFICIAL HIP JOINT: ICD-10-CM

## 2022-04-13 DIAGNOSIS — F41.9 ANXIETY DISORDER, UNSPECIFIED: ICD-10-CM

## 2022-04-13 DIAGNOSIS — F32.9 MAJOR DEPRESSIVE DISORDER, SINGLE EPISODE, UNSPECIFIED: ICD-10-CM

## 2022-04-13 DIAGNOSIS — I10 ESSENTIAL (PRIMARY) HYPERTENSION: ICD-10-CM

## 2022-04-13 DIAGNOSIS — E78.5 HYPERLIPIDEMIA, UNSPECIFIED: ICD-10-CM

## 2022-04-14 DIAGNOSIS — M16.12 UNILATERAL PRIMARY OSTEOARTHRITIS, LEFT HIP: ICD-10-CM

## 2022-05-11 ENCOUNTER — OUTPATIENT (OUTPATIENT)
Dept: OUTPATIENT SERVICES | Facility: HOSPITAL | Age: 72
LOS: 1 days | End: 2022-05-11
Payer: COMMERCIAL

## 2022-05-11 ENCOUNTER — APPOINTMENT (OUTPATIENT)
Dept: RADIOLOGY | Facility: CLINIC | Age: 72
End: 2022-05-11
Payer: COMMERCIAL

## 2022-05-11 DIAGNOSIS — M17.11 UNILATERAL PRIMARY OSTEOARTHRITIS, RIGHT KNEE: ICD-10-CM

## 2022-05-11 DIAGNOSIS — Z00.8 ENCOUNTER FOR OTHER GENERAL EXAMINATION: ICD-10-CM

## 2022-05-11 PROCEDURE — 73562 X-RAY EXAM OF KNEE 3: CPT

## 2022-05-11 PROCEDURE — 73562 X-RAY EXAM OF KNEE 3: CPT | Mod: 26,50

## 2022-08-07 ENCOUNTER — NON-APPOINTMENT (OUTPATIENT)
Age: 72
End: 2022-08-07

## 2022-09-07 ENCOUNTER — OUTPATIENT (OUTPATIENT)
Dept: OUTPATIENT SERVICES | Facility: HOSPITAL | Age: 72
LOS: 1 days | End: 2022-09-07
Payer: COMMERCIAL

## 2022-09-07 ENCOUNTER — APPOINTMENT (OUTPATIENT)
Dept: RADIOLOGY | Facility: CLINIC | Age: 72
End: 2022-09-07

## 2022-09-07 DIAGNOSIS — M25.511 PAIN IN RIGHT SHOULDER: ICD-10-CM

## 2022-09-07 PROCEDURE — 73030 X-RAY EXAM OF SHOULDER: CPT | Mod: 26,RT

## 2022-09-07 PROCEDURE — 73030 X-RAY EXAM OF SHOULDER: CPT

## 2022-11-05 ENCOUNTER — APPOINTMENT (OUTPATIENT)
Dept: MRI IMAGING | Facility: CLINIC | Age: 72
End: 2022-11-05

## 2022-11-05 ENCOUNTER — OUTPATIENT (OUTPATIENT)
Dept: OUTPATIENT SERVICES | Facility: HOSPITAL | Age: 72
LOS: 1 days | End: 2022-11-05
Payer: COMMERCIAL

## 2022-11-05 DIAGNOSIS — M19.011 PRIMARY OSTEOARTHRITIS, RIGHT SHOULDER: ICD-10-CM

## 2022-11-05 DIAGNOSIS — Z00.8 ENCOUNTER FOR OTHER GENERAL EXAMINATION: ICD-10-CM

## 2022-11-05 PROCEDURE — 73221 MRI JOINT UPR EXTREM W/O DYE: CPT

## 2022-11-05 PROCEDURE — 73221 MRI JOINT UPR EXTREM W/O DYE: CPT | Mod: 26,RT

## 2022-11-17 ENCOUNTER — RX RENEWAL (OUTPATIENT)
Age: 72
End: 2022-11-17

## 2022-11-28 ENCOUNTER — NON-APPOINTMENT (OUTPATIENT)
Age: 72
End: 2022-11-28

## 2022-12-13 ENCOUNTER — APPOINTMENT (OUTPATIENT)
Dept: ORTHOPEDIC SURGERY | Facility: CLINIC | Age: 72
End: 2022-12-13

## 2022-12-13 VITALS — HEIGHT: 67 IN | BODY MASS INDEX: 25.9 KG/M2 | WEIGHT: 165 LBS

## 2022-12-13 PROCEDURE — 99203 OFFICE O/P NEW LOW 30 MIN: CPT

## 2022-12-13 NOTE — REASON FOR VISIT
[Spouse] : spouse [FreeTextEntry2] : The patient is a new patient here today with complaints of right shoulder pain for the past 2 years.

## 2022-12-13 NOTE — HISTORY OF PRESENT ILLNESS
[de-identified] : The patient is a 72 year old female here with right anterior shoulder pain for the past 2 years. The patient sustained an injury August 2020 when several dogs ran into her causing her to fall on her right elbow sustaining an elbow fracture. The patient states her shoulder got jammed but no specific findings were noted at that time. Alecoreen helps with her pain. She has not done PT as of yet. Her  is a primary care physician, he aspirated the effusion and gave a cortisone injection 2 weeks ago which gave some relief.  There are no complaints of radiating shoulder pain, numbness or paraesthesias.

## 2022-12-13 NOTE — PHYSICAL EXAM
[Right] : right shoulder [Standing] : standing [Moderate] : moderate [5___] : internal rotation 5[unfilled]/5 [] : no deformity [FreeTextEntry3] : Large anterior effusion noted. [FreeTextEntry8] : No tenderness at the AC joint, biceps tendon or supraspinatus. [de-identified] : 4/5 supraspinatus. [FreeTextEntry1] : MRI right shoulder 11/5/22: massive sub deltoid fusion. biceps tendon high grade thickness tear. severe GHOA. degenerative tears of anterior and posterior labrum. partial thickness subscap tear. low grade partial thickness supra tear. degenerative tear of superior labrum. multiple loose bodies noted in sub deltoid fusion. type 2 acromion. [TWNoteComboBox7] : active forward flexion 170 degrees [de-identified] : active abduction 80 degrees [TWNoteComboBox6] : internal rotation L1 [de-identified] : external rotation 20 degrees

## 2022-12-13 NOTE — DISCUSSION/SUMMARY
[de-identified] : The patient has right shoulder severe GHOA\par \par Operative and non operative treatment options were discussed with the patient.\par The patient was told that she should consider reverse TSR surgery vs HA injections.\par The patient will consider her options.\par The patient will follow up with  for surgical consult when she is ready to proceed.\par \par I, Dr. Guillaume Abreu, attest that this documentation was recorded by the scribe, in my presence, and that it accurately reflects the service I personally performed, and the decisions made by me with my edits as appropriate.  \par \par I, Farideh Bergeron, acting as scribe, attest that this documentation has been prepared under the direction and in the presence of Provider Guillaume Abreu MD.\par

## 2023-01-23 ENCOUNTER — APPOINTMENT (OUTPATIENT)
Dept: ORTHOPEDIC SURGERY | Facility: CLINIC | Age: 73
End: 2023-01-23

## 2023-03-10 RX ORDER — TRETINOIN 1 MG/G
0.1 CREAM TOPICAL
Qty: 45 | Refills: 3 | Status: ACTIVE | COMMUNITY
Start: 2023-03-10 | End: 1900-01-01

## 2023-03-31 ENCOUNTER — APPOINTMENT (OUTPATIENT)
Dept: ORTHOPEDIC SURGERY | Facility: CLINIC | Age: 73
End: 2023-03-31
Payer: COMMERCIAL

## 2023-03-31 PROCEDURE — 99214 OFFICE O/P EST MOD 30 MIN: CPT

## 2023-03-31 NOTE — PHYSICAL EXAM
[de-identified] : Constitutional: The general appearance of the patient is well developed, well nourished, no deformities and well groomed. Normal  \par \par Gait: Gait and function is as follows: normal gait.  \par \par Skin: Head and neck visualized skin is normal. Left upper extremity visualized skin is normal. Right upper extremity visualized skin is normal. Thoracic Skin of the thoracic spine shows visualized skin is normal.  \par \par Cardiovascular: palpable radial pulse bilaterally, good capillary refill in digits of the bilateral upper extremities and no temperature or color changes in the bilateral upper extremities.  \par \par Lymphatic: Normal Palpation of lymph nodes in the cervical.  \par \par Neurologic: fine motor control in the bilateral upper extremities is intact. Deep Tendon Reflexes in Upper and Lower Extremities Negative Michaud's in the bilateral upper extremities. The patient is oriented to time, place and person. Sensation to light touch intact in the bilateral upper extremities. Mood and Affect is normal.  \par \par Left Shoulder: Inspection of the shoulder/upper arm is as follows: There is a full, pain-free, stable range of motion of the shoulder with normal strength and no tenderness to palpation.   \par \par Right Shoulder: Inspection of the shoulder/upper arm is as follows: Stable shoulder. Palpation of the shoulder/upper arm is as follows: There is tenderness at the AC joint, proximal biceps tendon and supraspinatus tendon. Range of motion of the shoulder is as follows: Pain with internal rotation, external rotation, abduction and forward flexion. Strength of the shoulder is as follows: Supraspinatus 4/5. External Rotation 4/5. Internal Rotation 4/5. Infraspinatus 5/5 4/5. Deltoid 5/5 Ligament Stability and Special Tests of the shoulder is as follows: Neer test is positive. Herrera' test is positive. Speed's test is positive. \par \par Neck: Inspection / Palpation of the cervical spine is as follows: There is a mild restricted range of motion of the cervical spine. Increase tone and mild tenderness to palpation. Stable ROM of cervical spine.  \par \par Back, including spine: Inspection / Palpation of the thoracic/lumbar spine is as follows: There is a full, pain free, stable range of motion of the thoracic spine with a normal tone and not tenderness to palpation..  \par \par \par

## 2023-03-31 NOTE — DISCUSSION/SUMMARY
[de-identified] : 73 y/o F presents to the office complaining of ongoing R shoulder pain; she has been indicated in the past for right reverse shoulder arthroplasty by Dr. Abreu.\par She has arthritis and rotator cuff tear arthropathy.\par Patient is in the interested in proceeding with right reverse shoulder arthroplasty in June.\par She was given a CT scan prescription for surgical planning.\par \par Pt has a significant osteoarthritis, with an intact deltoid, rotator cuff insufficiency, and inflammation to the biceps labral complex with proximal biceps tendinopathy. This has been present for more than 6 months and has not improved despite more than 3 months of conservative treatment including focused HEP/therapy, anti-inflammatory medication and activity modification. There have been no injections into the shoulder within 3 months of the indicated procedure. \par The patient is having severe pain with forward flexion and/or pseudoparalysis. \par The patient is interested in pursuing surgical treatment.\par We had a lengthy discussion about the surgery as well as the recovery. We discussed the risks which include but are not limited to infection, bleeding, need for blood transfusions, failure of treatment to alleviate all pain or improve function, the risk of injury to nerves and blood vessels.  There is also the risks inherent to anesthesia as well.  We discussed that given the patient’s distorted anatomy as a result of arthritis, these risks are real although every attempt will be made to mitigate these at the time of surgery.\par Pt understands there is no guarantee of success, however there is a high likelihood of functional improvement and pain relief. The patient understood all this was discussed.\par \par The patient is indicated for a Right reverse shoulder arthroplasty with biceps tenodesis.\par  \par * Surgery: Considering patient has failed all conservative treatment we are requesting authorization for:\par -	Right shoulder Reverse Shoulder Arthroplasty (CPT 19101) with Biceps Tenodesis (CPT 70453)\par Pt would like surgery (June)\par The patient will require a [place] 2.0 brace, cryotherapy, and 12 weeks of post-operative physical therapy.\par The patient will require a medical clearance and cardiac ( is PCP)\par The doctor will require the [employer] representative, 2 hours, and one assistant.\par \par \par (1) We discussed a comprehensive treatment plans that included a prescription management plan involving the use of prescription strength medications to include Ibuprofen 600-800 mg TID, versus 500-650 mg Tylenol. We also discussed prescribing topical diclofenac (Voltaren gel) as well as once daily Meloxicam 15 mg. \par \par (2) The patient has More Than One chronic injuries/illnesses as outlined, discussed, and documented by ICD 10 codes listed, as well as the HPI and Plan section. \par \par There is a moderate risk of morbidity with further treatment, especially from use of prescription strength medications and possible side effects of these medications which include upset stomach and cardiac/renal issues with long term use were discussed. \par \par (3) I recommended that the patient follow-up with their medical physician to discuss any significant specific potential issues with long term use such as interactions with current medications or with exacerbation of underlying medical morbidities.\par \par Attestation:\par \par I, Nilda Pena, attest that this documentation has been prepared under the direction and in the presence of Provider Tony Meza MD.\par \par The documentation recorded by the scribe, in my presence, accurately reflects the service I personally performed, and the decisions made by me with my edits as appropriate.\par \par Tony Meza MD\par

## 2023-03-31 NOTE — HISTORY OF PRESENT ILLNESS
[de-identified] : This is a 73 y/o F presenting to the office with right shoulder pain x months. Patient reports pain has been getting progressively worse. Denies any specific injury or trauma. Pain is now constant severe 6 /10. Denies any numbness or tingling. pain does improve with rest and ice. Pain is exacerbated with motion overhead.  Patient complains that shoulder pain is affecting ADL.s such as opening doors or blow-drying hair.. She had a previous cortisone injection which provided temporary relief. She claims to feel a burning sensation.\par ~\par Patient was original indicated for right reverse shoulder arthroplasty by Dr. Abreu.  Patient had aspiration and cortisone injection of serosanguineous fluid by her  his primary care doctor in November.  She is interested in surgery in June.\par

## 2023-04-13 ENCOUNTER — RESULT REVIEW (OUTPATIENT)
Age: 73
End: 2023-04-13

## 2023-05-02 RX ORDER — ALBUTEROL SULFATE 90 UG/1
108 (90 BASE) INHALANT RESPIRATORY (INHALATION)
Qty: 1 | Refills: 3 | Status: ACTIVE | COMMUNITY
Start: 2023-05-02 | End: 1900-01-01

## 2023-05-29 RX ORDER — GABAPENTIN 100 MG/1
100 CAPSULE ORAL
Qty: 30 | Refills: 3 | Status: DISCONTINUED | COMMUNITY
Start: 2022-03-27 | End: 2023-05-29

## 2023-05-29 RX ORDER — CELECOXIB 200 MG/1
200 CAPSULE ORAL DAILY
Qty: 90 | Refills: 0 | Status: DISCONTINUED | COMMUNITY
Start: 2021-10-18 | End: 2023-05-29

## 2023-05-29 RX ORDER — CLINDAMYCIN HYDROCHLORIDE 300 MG/1
300 CAPSULE ORAL
Qty: 10 | Refills: 3 | Status: DISCONTINUED | COMMUNITY
Start: 2022-04-26 | End: 2023-05-29

## 2023-05-29 RX ORDER — PROPRANOLOL HYDROCHLORIDE 10 MG/1
10 TABLET ORAL
Qty: 60 | Refills: 3 | Status: ACTIVE | COMMUNITY
Start: 2023-05-29 | End: 1900-01-01

## 2023-05-29 RX ORDER — AMLODIPINE BESYLATE 5 MG/1
5 TABLET ORAL
Qty: 90 | Refills: 3 | Status: DISCONTINUED | COMMUNITY
Start: 2019-07-08 | End: 2023-05-29

## 2023-06-02 ENCOUNTER — APPOINTMENT (OUTPATIENT)
Dept: ORTHOPEDIC SURGERY | Facility: CLINIC | Age: 73
End: 2023-06-02
Payer: COMMERCIAL

## 2023-06-02 PROCEDURE — 99214 OFFICE O/P EST MOD 30 MIN: CPT

## 2023-06-02 NOTE — PHYSICAL EXAM
[de-identified] : Constitutional: The general appearance of the patient is well developed, well nourished, no deformities and well groomed. Normal  \par \par Gait: Gait and function is as follows: normal gait.  \par \par Skin: Head and neck visualized skin is normal. Left upper extremity visualized skin is normal. Right upper extremity visualized skin is normal. Thoracic Skin of the thoracic spine shows visualized skin is normal.  \par \par Cardiovascular: palpable radial pulse bilaterally, good capillary refill in digits of the bilateral upper extremities and no temperature or color changes in the bilateral upper extremities.  \par \par Lymphatic: Normal Palpation of lymph nodes in the cervical.  \par \par Neurologic: fine motor control in the bilateral upper extremities is intact. Deep Tendon Reflexes in Upper and Lower Extremities Negative Michaud's in the bilateral upper extremities. The patient is oriented to time, place and person. Sensation to light touch intact in the bilateral upper extremities. Mood and Affect is normal.  \par \par Left Shoulder: Inspection of the shoulder/upper arm is as follows: There is a full, pain-free, stable range of motion of the shoulder with normal strength and no tenderness to palpation.   \par \par Right Shoulder: Inspection of the shoulder/upper arm is as follows: Stable shoulder. Palpation of the shoulder/upper arm is as follows: There is tenderness at the AC joint, proximal biceps tendon and supraspinatus tendon. Range of motion of the shoulder is as follows: Pain with internal rotation, external rotation, abduction and forward flexion. Strength of the shoulder is as follows: Supraspinatus 4/5. External Rotation 4/5. Internal Rotation 4/5. Infraspinatus 5/5 4/5. Deltoid 5/5 Ligament Stability and Special Tests of the shoulder is as follows: Neer test is positive. Herrera' test is positive. Speed's test is positive. \par \par Neck: Inspection / Palpation of the cervical spine is as follows: There is a mild restricted range of motion of the cervical spine. Increase tone and mild tenderness to palpation. Stable ROM of cervical spine.  \par \par Back, including spine: Inspection / Palpation of the thoracic/lumbar spine is as follows: There is a full, pain free, stable range of motion of the thoracic spine with a normal tone and not tenderness to palpation..  \par \par \par

## 2023-06-02 NOTE — HISTORY OF PRESENT ILLNESS
[de-identified] : This is a 71 y/o F presenting to the office with right shoulder pain x months. Patient reports pain has been getting progressively worse. Denies any specific injury or trauma. Pain is now constant severe 6 /10. Denies any numbness or tingling. pain does improve with rest and ice. Pain is exacerbated with motion overhead.  Patient complains that shoulder pain is affecting ADL.s such as opening doors or blow-drying hair.. She had a previous cortisone injection which provided temporary relief. She claims to feel a burning sensation.\par ~\par Patient was original indicated for right reverse shoulder arthroplasty by Dr. Abreu.  Patient had aspiration and cortisone injection of serosanguineous fluid by her  his primary care doctor in November.  She is interested in surgery in June.\par 6/2/23: Patient returns today for worsening right shoulder pain and limited motion.  She has rotator cuff tear arthropathy.  She has failed all conservative management and wishes to discuss surgery

## 2023-06-02 NOTE — DISCUSSION/SUMMARY
[de-identified] : 73 y/o F presents to the office complaining of ongoing R shoulder pain; she has been indicated in the past for right reverse shoulder arthroplasty by Dr. Abreu.\par Patient has rotator cuff tear arthropathy.\par Discussed he be candidate for right reverse total shoulder arthroplasty.\par Patient has failed all conservative management and wishes to  proceed with surgery.\par \par The patient is indicated for a Right reverse shoulder arthroplasty with biceps tenodesis.\par  \par * Surgery: Considering patient has failed all conservative treatment we are requesting authorization for:\par -	Right shoulder Reverse Shoulder Arthroplasty (CPT 85173) with Biceps Tenodesis (CPT 88224)\par \par We had a long discussion about the risks and benefits of operative and non-operative treatment. We discussed the pertinent risks of surgery which include but are not limited to infection, pain, stiffness, arthrofibrosis, failure to alleviate symptoms, and injury to nerves or vessels.\par In addition, we discussed in great detail the postoperative protocol to include appropriate bracing and time of immobilization. Patient was fit for the [employer] in the office today. We discussed limitations in postoperative driving as well as the appropriate use of pain medication prescribed after surgery.\par The patient acknowledged all of the above and will proceed with the recommended surgery.\par Prescriptions for postoperative medications were provided. All final questions were answered to the patient’s satisfaction. The patient will follow up at his scheduled surgical time.\par \par \par \par (1) We discussed a comprehensive treatment plans that included a prescription management plan involving the use of prescription strength medications to include Ibuprofen 600-800 mg TID, versus 500-650 mg Tylenol. We also discussed prescribing topical diclofenac (Voltaren gel) as well as once daily Meloxicam 15 mg. \par \par (2) The patient has More Than One chronic injuries/illnesses as outlined, discussed, and documented by ICD 10 codes listed, as well as the HPI and Plan section. \par \par There is a moderate risk of morbidity with further treatment, especially from use of prescription strength medications and possible side effects of these medications which include upset stomach and cardiac/renal issues with long term use were discussed. \par \par (3) I recommended that the patient follow-up with their medical physician to discuss any significant specific potential issues with long term use such as interactions with current medications or with exacerbation of underlying medical morbidities.\par \par \par

## 2023-06-06 ENCOUNTER — APPOINTMENT (OUTPATIENT)
Dept: CARDIOLOGY | Facility: CLINIC | Age: 73
End: 2023-06-06
Payer: COMMERCIAL

## 2023-06-06 ENCOUNTER — NON-APPOINTMENT (OUTPATIENT)
Age: 73
End: 2023-06-06

## 2023-06-06 VITALS
HEIGHT: 67 IN | OXYGEN SATURATION: 97 % | DIASTOLIC BLOOD PRESSURE: 80 MMHG | WEIGHT: 155 LBS | SYSTOLIC BLOOD PRESSURE: 124 MMHG | HEART RATE: 75 BPM | BODY MASS INDEX: 24.33 KG/M2

## 2023-06-06 DIAGNOSIS — M75.51 BURSITIS OF RIGHT SHOULDER: ICD-10-CM

## 2023-06-06 DIAGNOSIS — Z87.2 PERSONAL HISTORY OF DISEASES OF THE SKIN AND SUBCUTANEOUS TISSUE: ICD-10-CM

## 2023-06-06 DIAGNOSIS — M54.12 RADICULOPATHY, CERVICAL REGION: ICD-10-CM

## 2023-06-06 DIAGNOSIS — M25.552 PAIN IN LEFT HIP: ICD-10-CM

## 2023-06-06 DIAGNOSIS — M19.011 PRIMARY OSTEOARTHRITIS, RIGHT SHOULDER: ICD-10-CM

## 2023-06-06 DIAGNOSIS — Z87.19 PERSONAL HISTORY OF OTHER DISEASES OF THE DIGESTIVE SYSTEM: ICD-10-CM

## 2023-06-06 DIAGNOSIS — R21 RASH AND OTHER NONSPECIFIC SKIN ERUPTION: ICD-10-CM

## 2023-06-06 DIAGNOSIS — B00.2 HERPESVIRAL GINGIVOSTOMATITIS AND PHARYNGOTONSILLITIS: ICD-10-CM

## 2023-06-06 DIAGNOSIS — H02.729 MADAROSIS OF UNSPECIFIED EYE, UNSPECIFIED EYELID AND PERIOCULAR AREA: ICD-10-CM

## 2023-06-06 DIAGNOSIS — Z20.822 CONTACT WITH AND (SUSPECTED) EXPOSURE TO COVID-19: ICD-10-CM

## 2023-06-06 DIAGNOSIS — M79.2 NEURALGIA AND NEURITIS, UNSPECIFIED: ICD-10-CM

## 2023-06-06 DIAGNOSIS — J45.909 UNSPECIFIED ASTHMA, UNCOMPLICATED: ICD-10-CM

## 2023-06-06 DIAGNOSIS — S52.021A DISPLACED FRACTURE OF OLECRANON PROCESS W/OUT INTRAARTICULAR EXTENSION OF RIGHT ULNA, INITIAL ENCOUNTER FOR CLOSED FRACTURE: ICD-10-CM

## 2023-06-06 DIAGNOSIS — N39.0 URINARY TRACT INFECTION, SITE NOT SPECIFIED: ICD-10-CM

## 2023-06-06 PROCEDURE — 99204 OFFICE O/P NEW MOD 45 MIN: CPT

## 2023-06-06 PROCEDURE — 93000 ELECTROCARDIOGRAM COMPLETE: CPT

## 2023-06-06 RX ORDER — BIMATOPROST 0.3 MG/ML
0.03 SOLUTION/ DROPS OPHTHALMIC
Qty: 1 | Refills: 5 | Status: DISCONTINUED | COMMUNITY
Start: 2023-01-18 | End: 2023-06-06

## 2023-06-06 RX ORDER — OXYCODONE AND ACETAMINOPHEN 5; 325 MG/1; MG/1
5-325 TABLET ORAL
Qty: 40 | Refills: 0 | Status: DISCONTINUED | COMMUNITY
Start: 2023-06-02 | End: 2023-06-06

## 2023-06-06 RX ORDER — BETAMETHASONE DIPROPIONATE 0.5 MG/G
0.05 CREAM TOPICAL DAILY
Qty: 1 | Refills: 1 | Status: DISCONTINUED | COMMUNITY
Start: 2020-08-18 | End: 2023-06-06

## 2023-06-06 NOTE — DISCUSSION/SUMMARY
[FreeTextEntry1] : Pt is a 73 y/o F who is referred here today by their PCP for evaluation prior to R shoulder replacement 06/13/2023 with Dr Meza.  Pt has PMH HTN, HLD with high HDL, hypothyroidism, essential tremor on propranolol\par Pt reports feeling well and has no active cardiac complaints - denies CP, SOB, palpitations, dizziness, syncope, edema, orthopnea, PND, orthopnea.  No exertional symptoms.  She is able to walk at least 5-6 blocks and climb about 3 flights of stairs\par \par At this time the pt has no signs or symptoms of active ischemia, heart failure, life-threatening arrhythmias, severe valvular pathology.\par VSS\par There are no cardiac contraindications for planned procedure. \par \par HTN:\par well controlled\par c/w current meds\par Discussed the long-term health risks of uncontrolled BP.  Advised low salt diet, regular exercise, maintaining ideal weight.\par Encouraged pt to check BP at home and keep journal \par \par HLD:\par with high HDL\par Advised lifestyle modifications \par \par Most recent available lab results were reviewed with pt.\par The described plan was discussed with the pt.  All questions and concerns were addressed to the best of my knowledge.

## 2023-06-06 NOTE — HISTORY OF PRESENT ILLNESS
[No Pertinent Cardiac History] : no history of aortic stenosis, atrial fibrillation, coronary artery disease, recent myocardial infarction, or implantable device/pacemaker [No Pertinent Pulmonary History] : no history of asthma, COPD, sleep apnea, or smoking [No Adverse Anesthesia Reaction] : no adverse anesthesia reaction in self or family member [Chronic Anticoagulation] : no chronic anticoagulation [Chronic Kidney Disease] : no chronic kidney disease [Diabetes] : no diabetes [FreeTextEntry1] : Right reverse shoulder replacement [FreeTextEntry2] : June 13 [FreeTextEntry3] : Dr. Meza [FreeTextEntry4] : Patient has bone-on-bone right shoulder constant pain chronic recurrent effusion to have reverse shoulder replacement\par \par She has no cardiovascular symptoms with exertion.She exercises with a  regularly\par \par   She has a history of asthma in the distant past which proved to be Allergy and has not needed any inhalers.. Quit smoking years ago\par \par She uses alprazolam occasionally for sleep or anxiety \par \par Patient has had bilateral hip replacements first 1 approximately 10 years ago left hip approximately 1 year ago surgery was uneventful.  She also experienced fracture of her right olecranon requiring ORIF approximately 3 years ago all surgeries were uneventful\par \par Obesity has greatly improved with Wegovy she has lost approximately 30 pounds continues to use it weekly\par \par Longstanding essential tremor she is to initiate propanolol postoperatively\par \par Hashimoto's thyroiditis controlled with levothyroxine\par \par She has mild depression which is controlled with escitalopram\par \par Hypertension is well controlled with current meds

## 2023-06-06 NOTE — ASSESSMENT
[Patient Optimized for Surgery] : Patient optimized for surgery [Cardiology consultation] : Cardiology consultation [FreeTextEntry4] : patient is in best possible condition for surgery and she is medically cleared.\par \par Cardiological clearance as requested by Dr Meza

## 2023-06-06 NOTE — HISTORY OF PRESENT ILLNESS
[FreeTextEntry1] : Pt is a 73 y/o F who is referred here today by their PCP for evaluation prior to R shoulder replacement 06/13/2023 with Dr Meza.  Pt has PMH HTN, HLD with high HDL, hypothyroidism, essential tremor on propranolol\par Pt reports feeling well and has no active cardiac complaints - denies CP, SOB, palpitations, dizziness, syncope, edema, orthopnea, PND, orthopnea.  No exertional symptoms.  She is able to walk at least 5-6 blocks and climb about 3 flights of stairs\par \par PMH:\par Family hx: brother sarcoidosis involving the heart\par Smoking status: quit 1982\par social ETOH\par no drug use\par Current exercise: none \par Daily water intake: 1 quart\par Daily caffeine intake: 1 cup coffee\par OTC medications: none\par Previous cardiac testing: none\par Previous hospitalizations: R wrist surgery 2010, b/l hip replacements 2013 and 2022, R olecranon ORIF 2020 - no problems with anesthesia\par 1 child - no problem with pregnancy\par

## 2023-06-06 NOTE — PHYSICAL EXAM
[No Edema] : there was no peripheral edema [Normal] : affect was normal and insight and judgment were intact [de-identified] : right shoulder effusion

## 2023-06-07 ENCOUNTER — FORM ENCOUNTER (OUTPATIENT)
Age: 73
End: 2023-06-07

## 2023-06-09 RX ORDER — OXYCODONE AND ACETAMINOPHEN 5; 325 MG/1; MG/1
5-325 TABLET ORAL
Qty: 40 | Refills: 0 | Status: ACTIVE | COMMUNITY
Start: 2023-06-09 | End: 1900-01-01

## 2023-06-13 ENCOUNTER — RESULT REVIEW (OUTPATIENT)
Age: 73
End: 2023-06-13

## 2023-06-13 ENCOUNTER — APPOINTMENT (OUTPATIENT)
Dept: ORTHOPEDIC SURGERY | Facility: HOSPITAL | Age: 73
End: 2023-06-13
Payer: COMMERCIAL

## 2023-06-13 PROCEDURE — 23472 RECONSTRUCT SHOULDER JOINT: CPT | Mod: AS,RT

## 2023-06-13 PROCEDURE — 23472 RECONSTRUCT SHOULDER JOINT: CPT | Mod: RT

## 2023-06-13 PROCEDURE — 23395 MUSCLE TRANSFER SHOULDER/ARM: CPT | Mod: 59,RT

## 2023-06-13 PROCEDURE — 23395 MUSCLE TRANSFER SHOULDER/ARM: CPT | Mod: AS,59,RT

## 2023-06-19 ENCOUNTER — APPOINTMENT (OUTPATIENT)
Dept: ORTHOPEDIC SURGERY | Facility: CLINIC | Age: 73
End: 2023-06-19
Payer: COMMERCIAL

## 2023-06-19 PROCEDURE — 73030 X-RAY EXAM OF SHOULDER: CPT | Mod: RT

## 2023-06-19 PROCEDURE — 99024 POSTOP FOLLOW-UP VISIT: CPT

## 2023-06-19 NOTE — DISCUSSION/SUMMARY
[de-identified] : This is a 73 yo female 6 days s/p right reverse total shoulder replacement, and biceps tenodesis. \par patient is doing well, pain is controlled.  \par Incision is healing well with no evidence of infection or hematoma\par xrays today demonstrates excellent overall alignment. \par Patient was provided PT prescription according to reverse protocol. They will start in 3 weeks. \par Continue with sling immobilizer x 1 week full time and part time 2 weeks.\par Continue elbow,wrist,hand motion. \par Patient will follow up in 1 month.\par

## 2023-06-19 NOTE — PHYSICAL EXAM
[Right] : right shoulder [There are no fractures, subluxations or dislocations. No significant abnormalities are seen] : There are no fractures, subluxations or dislocations. No significant abnormalities are seen [Components well fixed, in good position] : Components well fixed, in good position [de-identified] : Constitutional: The general appearance of the patient is well developed, well nourished, no deformities and well groomed. Normal  \par \par Gait: Gait and function is as follows: normal gait.  \par \par Skin: Head and neck visualized skin is normal. Left upper extremity visualized skin is normal. Right upper extremity visualized skin is normal. Thoracic Skin of the thoracic spine shows visualized skin is normal.  \par \par Cardiovascular: palpable radial pulse bilaterally, good capillary refill in digits of the bilateral upper extremities and no temperature or color changes in the bilateral upper extremities.  \par \par Lymphatic: Normal Palpation of lymph nodes in the cervical.  \par \par Neurologic: fine motor control in the bilateral upper extremities is intact. Deep Tendon Reflexes in Upper and Lower Extremities Negative Michaud's in the bilateral upper extremities. The patient is oriented to time, place and person. Sensation to light touch intact in the bilateral upper extremities. Mood and Affect is normal.  \par \par Left Shoulder: Inspection of the shoulder/upper arm is as follows: There is a full, pain-free, stable range of motion of the shoulder with normal strength and no tenderness to palpation.   \par \par Right Shoulder:  Inspection of the shoulder/upper arm is as follows: Stable shoulder. Palpation of the shoulder/upper arm is as follows: There is mild diffuse tenderness . Range of motion of the shoulder is as follows: Limited secondary to immobilization/surgery. Strength of the shoulder is as follows:  Deltoid 5/5 Ligament Stability and Special Tests of the shoulder is as follows: Stable shoulder\par Incisions benign, no erythema/ swelling.\par  \par \par Neck: Inspection / Palpation of the cervical spine is as follows: There is a mild restricted range of motion of the cervical spine. Increase tone and mild tenderness to palpation. Stable ROM of cervical spine.  \par \par Back, including spine: Inspection / Palpation of the thoracic/lumbar spine is as follows: There is a full, pain free, stable range of motion of the thoracic spine with a normal tone and not tenderness to palpation..  \par \par \par

## 2023-06-19 NOTE — HISTORY OF PRESENT ILLNESS
[de-identified] : This is a 71 y/o F presenting to the office with right shoulder pain x months. Patient reports pain has been getting progressively worse. Denies any specific injury or trauma. Pain is now constant severe 6 /10. Denies any numbness or tingling. pain does improve with rest and ice. Pain is exacerbated with motion overhead.  Patient complains that shoulder pain is affecting ADL.s such as opening doors or blow-drying hair.. She had a previous cortisone injection which provided temporary relief. She claims to feel a burning sensation.\par ~\par Patient was original indicated for right reverse shoulder arthroplasty by Dr. Abreu.  Patient had aspiration and cortisone injection of serosanguineous fluid by her  his primary care doctor in November.  She is interested in surgery in June.\par \par 6/2/23: Patient returns today for worsening right shoulder pain and limited motion.  She has rotator cuff tear arthropathy.  She has failed all conservative management and wishes to discuss surgery\par 6/19/23: Patient presents 6 days s/p right reverse total shoulder replacement and biceps tenodesis. Patient is doing well, pain is controlled. \par Patient has been compliant with the brace. Denies any fever, chills, drainage.\par

## 2023-07-17 ENCOUNTER — APPOINTMENT (OUTPATIENT)
Dept: ORTHOPEDIC SURGERY | Facility: CLINIC | Age: 73
End: 2023-07-17
Payer: COMMERCIAL

## 2023-07-17 VITALS — BODY MASS INDEX: 24.33 KG/M2 | HEIGHT: 67 IN | WEIGHT: 155 LBS

## 2023-07-17 PROCEDURE — 99024 POSTOP FOLLOW-UP VISIT: CPT

## 2023-07-17 NOTE — DISCUSSION/SUMMARY
[de-identified] : RUE:  drg\par \par This is a 73 yo female 5 weeks s/p right reverse total shoulder replacement, and biceps tenodesis. \par patient is doing well, pain is controlled.  Incision benign \par ROM is progressing as expected \par Prior xrays demonstrates excellent overall alignment. \par Patient was provided PT prescription according to reverse protocol. \par Patient will follow up in 2 months \par \par \par (1) We discussed a comprehensive treatment plans that included a prescription management plan involving the use of prescription strength medications to include Ibuprofen 600-800 mg TID, versus 500-650 mg Tylenol. We also discussed prescribing topical diclofenac (Voltaren gel) as well as once daily Meloxicam 15 mg.\par (2) The patient has More Than One chronic injuries/illnesses as outlined, discussed, and documented by ICD 10 codes listed, as well as the HPI and Plan section.\par There is a moderate risk of morbidity with further treatment, especially from use of prescription strength medications and possible side effects of these medications which include upset stomach and cardiac/renal issues with long term use were discussed.\par (3) I recommended that the patient follow-up with their medical physician to discuss any significant specific potential issues with long term use such as interactions with current medications or with exacerbation of underlying medical morbidities. \par \par Attestation:\par I, Christy GUERRIER'Ana Maria , attest that this documentation has been prepared under the direction and in the presence of Provider Tony Meza MD.\par The documentation recorded by the scribe, in my presence, accurately reflects the service I personally performed, and the decisions made by me with my edits as appropriate.\par Tony Meza MD\par

## 2023-07-17 NOTE — HISTORY OF PRESENT ILLNESS
[de-identified] : This is a 73 y/o F presenting to the office with right shoulder pain x months. Patient reports pain has been getting progressively worse. Denies any specific injury or trauma. Pain is now constant severe 6 /10. Denies any numbness or tingling. pain does improve with rest and ice. Pain is exacerbated with motion overhead.  Patient complains that shoulder pain is affecting ADL.s such as opening doors or blow-drying hair.. She had a previous cortisone injection which provided temporary relief. She claims to feel a burning sensation.\par ~\par Patient was original indicated for right reverse shoulder arthroplasty by Dr. Abreu.  Patient had aspiration and cortisone injection of serosanguineous fluid by her  his primary care doctor in November.  She is interested in surgery in June.\par \par 6/2/23: Patient returns today for worsening right shoulder pain and limited motion.  She has rotator cuff tear arthropathy.  She has failed all conservative management and wishes to discuss surgery\par 6/19/23: Patient presents 6 days s/p right reverse total shoulder replacement and biceps tenodesis. Patient is doing well, pain is controlled. \par Patient has been compliant with the brace. Denies any fever, chills, drainage.\par 7/17/23: Patient presents 5 weeks  s/p right reverse total shoulder replacement and biceps tenodesis. Pt doing well and has not started PT due to insurance coverage

## 2023-07-17 NOTE — PHYSICAL EXAM
[de-identified] : Constitutional: The general appearance of the patient is well developed, well nourished, no deformities and well groomed. Normal  \par \par Gait: Gait and function is as follows: normal gait.  \par \par Skin: Head and neck visualized skin is normal. Left upper extremity visualized skin is normal. Right upper extremity visualized skin is normal. Thoracic Skin of the thoracic spine shows visualized skin is normal.  \par \par Cardiovascular: palpable radial pulse bilaterally, good capillary refill in digits of the bilateral upper extremities and no temperature or color changes in the bilateral upper extremities.  \par \par Lymphatic: Normal Palpation of lymph nodes in the cervical.  \par \par Neurologic: fine motor control in the bilateral upper extremities is intact. Deep Tendon Reflexes in Upper and Lower Extremities Negative Michaud's in the bilateral upper extremities. The patient is oriented to time, place and person. Sensation to light touch intact in the bilateral upper extremities. Mood and Affect is normal.  \par \par Left Shoulder: Inspection of the shoulder/upper arm is as follows: There is a full, pain-free, stable range of motion of the shoulder with normal strength and no tenderness to palpation.   \par \par Right Shoulder:  Inspection of the shoulder/upper arm is as follows: Stable shoulder. Palpation of the shoulder/upper arm is as follows: There is mild diffuse tenderness . Range of motion of the shoulder is as follows: Limited secondary to immobilization/surgery. Strength of the shoulder is as follows:  Deltoid 5/5 Ligament Stability and Special Tests of the shoulder is as follows: Stable shoulder\par Incisions benign, no erythema/ swelling.\par  \par \par Neck: Inspection / Palpation of the cervical spine is as follows: There is a mild restricted range of motion of the cervical spine. Increase tone and mild tenderness to palpation. Stable ROM of cervical spine.  \par \par Back, including spine: Inspection / Palpation of the thoracic/lumbar spine is as follows: There is a full, pain free, stable range of motion of the thoracic spine with a normal tone and not tenderness to palpation..  \par \par \par

## 2023-09-05 RX ORDER — HYDROCHLOROTHIAZIDE 12.5 MG/1
12.5 TABLET ORAL
Qty: 90 | Refills: 3 | Status: ACTIVE | COMMUNITY
Start: 2020-01-03 | End: 1900-01-01

## 2023-09-06 ENCOUNTER — APPOINTMENT (OUTPATIENT)
Dept: FAMILY MEDICINE | Facility: CLINIC | Age: 73
End: 2023-09-06
Payer: COMMERCIAL

## 2023-09-06 VITALS
HEART RATE: 74 BPM | TEMPERATURE: 97.9 F | DIASTOLIC BLOOD PRESSURE: 80 MMHG | SYSTOLIC BLOOD PRESSURE: 126 MMHG | HEIGHT: 67 IN | WEIGHT: 154 LBS | BODY MASS INDEX: 24.17 KG/M2 | OXYGEN SATURATION: 97 %

## 2023-09-06 DIAGNOSIS — Z01.810 ENCOUNTER FOR PREPROCEDURAL CARDIOVASCULAR EXAMINATION: ICD-10-CM

## 2023-09-06 DIAGNOSIS — M25.511 PAIN IN RIGHT SHOULDER: ICD-10-CM

## 2023-09-06 DIAGNOSIS — F41.9 ANXIETY DISORDER, UNSPECIFIED: ICD-10-CM

## 2023-09-06 DIAGNOSIS — M75.121 COMPLETE ROTATOR CUFF TEAR OR RUPTURE OF RIGHT SHOULDER, NOT SPECIFIED AS TRAUMATIC: ICD-10-CM

## 2023-09-06 DIAGNOSIS — Z23 ENCOUNTER FOR IMMUNIZATION: ICD-10-CM

## 2023-09-06 DIAGNOSIS — G89.29 PAIN IN RIGHT SHOULDER: ICD-10-CM

## 2023-09-06 DIAGNOSIS — G25.0 ESSENTIAL TREMOR: ICD-10-CM

## 2023-09-06 PROCEDURE — 90671 PCV15 VACCINE IM: CPT

## 2023-09-06 PROCEDURE — 90472 IMMUNIZATION ADMIN EACH ADD: CPT

## 2023-09-06 PROCEDURE — 90662 IIV NO PRSV INCREASED AG IM: CPT

## 2023-09-06 PROCEDURE — G0009: CPT

## 2023-09-06 PROCEDURE — 99397 PER PM REEVAL EST PAT 65+ YR: CPT | Mod: 25

## 2023-09-06 NOTE — REVIEW OF SYSTEMS
[Joint Pain] : joint pain [Negative] : Psychiatric [FreeTextEntry7] : Occasional diarrhea and abdominal cramps on Wegovy

## 2023-09-06 NOTE — HISTORY OF PRESENT ILLNESS
[FreeTextEntry1] : Annual physical [de-identified] : Hypertension has been well controlled on current medications  Status post reverse right shoulder replacement has been successful  Preop blood work in May including A1c within normal limits  Patient is due for mammogram  No cardiovascular symptoms with exertion  Hashimoto's thyroiditis on levothyroxine  Has lost significant amount of weight on Wegovy  Depression controlled with Lexapro  Bilateral knee pain right worse than left

## 2023-09-06 NOTE — HEALTH RISK ASSESSMENT
[Excellent] : ~his/her~  mood as  excellent [Yes] : Yes [No falls in past year] : Patient reported no falls in the past year [PHQ-2 Negative - No further assessment needed] : PHQ-2 Negative - No further assessment needed [de-identified] : Occasionally [Patient reported mammogram was normal] : Patient reported mammogram was normal [Patient reported bone density results were normal] : Patient reported bone density results were normal [Patient reported colonoscopy was normal] : Patient reported colonoscopy was normal [Change in mental status noted] : No change in mental status noted [None] : None [With Family] : lives with family [College] : College [] :  [# Of Children ___] : has [unfilled] children [Fully functional (bathing, dressing, toileting, transferring, walking, feeding)] : Fully functional (bathing, dressing, toileting, transferring, walking, feeding) [Fully functional (using the telephone, shopping, preparing meals, housekeeping, doing laundry, using] : Fully functional and needs no help or supervision to perform IADLs (using the telephone, shopping, preparing meals, housekeeping, doing laundry, using transportation, managing medications and managing finances) [Reports changes in hearing] : Reports no changes in hearing [Seat Belt] :  uses seat belt [MammogramDate] : 04/19 [MammogramComments] : Follow-up ordered [BoneDensityDate] : 04/17 [ColonoscopyComments] : Cologuard 2023 negative [Former] : Former [> 15 Years] : > 15 Years

## 2023-09-06 NOTE — PHYSICAL EXAM
[No Edema] : there was no peripheral edema [Normal] : no posterior cervical lymphadenopathy and no anterior cervical lymphadenopathy [de-identified] : Mild knee arthritic deformity

## 2023-09-07 LAB
CHOLEST SERPL-MCNC: 232 MG/DL
ESTIMATED AVERAGE GLUCOSE: 100 MG/DL
HBA1C MFR BLD HPLC: 5.1 %
HDLC SERPL-MCNC: 103 MG/DL
LDLC SERPL CALC-MCNC: 114 MG/DL
NONHDLC SERPL-MCNC: 129 MG/DL
TRIGL SERPL-MCNC: 86 MG/DL
TSH SERPL-ACNC: 0.73 UIU/ML

## 2023-09-08 ENCOUNTER — MED ADMIN CHARGE (OUTPATIENT)
Age: 73
End: 2023-09-08

## 2023-09-25 ENCOUNTER — APPOINTMENT (OUTPATIENT)
Dept: ORTHOPEDIC SURGERY | Facility: CLINIC | Age: 73
End: 2023-09-25
Payer: COMMERCIAL

## 2023-09-25 DIAGNOSIS — M19.011 PRIMARY OSTEOARTHRITIS, RIGHT SHOULDER: ICD-10-CM

## 2023-09-25 DIAGNOSIS — Z96.611 PRESENCE OF RIGHT ARTIFICIAL SHOULDER JOINT: ICD-10-CM

## 2023-09-25 PROCEDURE — 73030 X-RAY EXAM OF SHOULDER: CPT | Mod: RT

## 2023-09-25 PROCEDURE — 99214 OFFICE O/P EST MOD 30 MIN: CPT

## 2024-02-05 DIAGNOSIS — J20.9 ACUTE BRONCHITIS, UNSPECIFIED: ICD-10-CM

## 2024-02-20 ENCOUNTER — APPOINTMENT (OUTPATIENT)
Dept: PLASTIC SURGERY | Facility: CLINIC | Age: 74
End: 2024-02-20

## 2024-02-27 ENCOUNTER — TRANSCRIPTION ENCOUNTER (OUTPATIENT)
Age: 74
End: 2024-02-27

## 2024-03-11 ENCOUNTER — APPOINTMENT (OUTPATIENT)
Dept: FAMILY MEDICINE | Facility: CLINIC | Age: 74
End: 2024-03-11
Payer: COMMERCIAL

## 2024-03-11 VITALS
TEMPERATURE: 97.9 F | DIASTOLIC BLOOD PRESSURE: 72 MMHG | HEIGHT: 67 IN | SYSTOLIC BLOOD PRESSURE: 120 MMHG | HEART RATE: 75 BPM | OXYGEN SATURATION: 97 %

## 2024-03-11 VITALS — WEIGHT: 172 LBS | HEIGHT: 65 IN | BODY MASS INDEX: 28.66 KG/M2

## 2024-03-11 DIAGNOSIS — I10 ESSENTIAL (PRIMARY) HYPERTENSION: ICD-10-CM

## 2024-03-11 DIAGNOSIS — Z00.00 ENCOUNTER FOR GENERAL ADULT MEDICAL EXAMINATION W/OUT ABNORMAL FINDINGS: ICD-10-CM

## 2024-03-11 DIAGNOSIS — E03.8 OTHER SPECIFIED HYPOTHYROIDISM: ICD-10-CM

## 2024-03-11 DIAGNOSIS — M17.11 UNILATERAL PRIMARY OSTEOARTHRITIS, RIGHT KNEE: ICD-10-CM

## 2024-03-11 DIAGNOSIS — E66.9 OBESITY, UNSPECIFIED: ICD-10-CM

## 2024-03-11 DIAGNOSIS — E06.3 OTHER SPECIFIED HYPOTHYROIDISM: ICD-10-CM

## 2024-03-11 PROCEDURE — 99214 OFFICE O/P EST MOD 30 MIN: CPT

## 2024-03-11 NOTE — HEALTH RISK ASSESSMENT
[No falls in past year] : Patient reported no falls in the past year [Former] : Former [10-14] : 10-14 [de-identified] : Occasionally [de-identified] : Intermittent smoker

## 2024-03-11 NOTE — REVIEW OF SYSTEMS
[Negative] : Constitutional [FreeTextEntry6] : Occasional asthma [FreeTextEntry7] : Occasional diarrhea

## 2024-03-11 NOTE — PHYSICAL EXAM
[No Edema] : there was no peripheral edema [Normal] : no posterior cervical lymphadenopathy and no anterior cervical lymphadenopathy [de-identified] : See H&P

## 2024-03-11 NOTE — HISTORY OF PRESENT ILLNESS
[FreeTextEntry1] : Obesity [de-identified] : Here to renew her Wegovy which she has been tolerating well however she has not achieved a BMI less than 27.  Current BMI is 28.6 she is also hypertensive and has osteoarthritis of both knees.  She is on levothyroxine for hypothyroidism  She has a tiny erythematous flat lesion tip of her nose which blanches with pressure.  Dermatoscope does not show telangiectasias there is no curling at the edges

## 2024-03-12 ENCOUNTER — TRANSCRIPTION ENCOUNTER (OUTPATIENT)
Age: 74
End: 2024-03-12

## 2024-03-15 RX ORDER — HYDROCODONE BITARTRATE AND HOMATROPINE METHYLBROMIDE 1.5; 5 MG/5ML; MG/5ML
5-1.5 SOLUTION ORAL
Qty: 240 | Refills: 0 | Status: DISCONTINUED | COMMUNITY
Start: 2022-04-08 | End: 2024-03-15

## 2024-03-15 RX ORDER — SEMAGLUTIDE 2.4 MG/.75ML
2.4 INJECTION, SOLUTION SUBCUTANEOUS
Qty: 3 | Refills: 1 | Status: ACTIVE | COMMUNITY
Start: 2021-11-29

## 2024-04-02 ENCOUNTER — RX RENEWAL (OUTPATIENT)
Age: 74
End: 2024-04-02

## 2024-04-24 ENCOUNTER — TRANSCRIPTION ENCOUNTER (OUTPATIENT)
Age: 74
End: 2024-04-24

## 2024-04-30 ENCOUNTER — RX RENEWAL (OUTPATIENT)
Age: 74
End: 2024-04-30

## 2024-04-30 RX ORDER — LOSARTAN POTASSIUM 100 MG/1
100 TABLET, FILM COATED ORAL
Qty: 90 | Refills: 2 | Status: ACTIVE | COMMUNITY
Start: 2020-01-03 | End: 1900-01-01

## 2024-05-20 DIAGNOSIS — Z01.818 ENCOUNTER FOR OTHER PREPROCEDURAL EXAMINATION: ICD-10-CM

## 2024-05-20 RX ORDER — VALACYCLOVIR 1 G/1
1 TABLET, FILM COATED ORAL
Qty: 30 | Refills: 3 | Status: ACTIVE | COMMUNITY
Start: 2017-05-19 | End: 1900-01-01

## 2024-05-21 PROBLEM — Z01.818 PRE-OPERATIVE CLEARANCE: Status: ACTIVE | Noted: 2020-08-15

## 2024-05-21 RX ORDER — SULFAMETHOXAZOLE AND TRIMETHOPRIM 800; 160 MG/1; MG/1
800-160 TABLET ORAL TWICE DAILY
Qty: 20 | Refills: 1 | Status: DISCONTINUED | COMMUNITY
Start: 2022-11-17 | End: 2024-05-21

## 2024-05-22 NOTE — HISTORY OF PRESENT ILLNESS
[No Pertinent Cardiac History] : no history of aortic stenosis, atrial fibrillation, coronary artery disease, recent myocardial infarction, or implantable device/pacemaker [Asthma] : asthma [No Adverse Anesthesia Reaction] : no adverse anesthesia reaction in self or family member [(Patient denies any chest pain, claudication, dyspnea on exertion, orthopnea, palpitations or syncope)] : Patient denies any chest pain, claudication, dyspnea on exertion, orthopnea, palpitations or syncope [Chronic Anticoagulation] : no chronic anticoagulation [Chronic Kidney Disease] : no chronic kidney disease [Diabetes] : no diabetes [FreeTextEntry1] : face lift [FreeTextEntry2] : 5/28 [FreeTextEntry3] : [place] [FreeTextEntry4] : Face lift, neck lift, lateral brow lift scheduled for May 28.  Patient has no cardiovascular symptoms with exertion she exercises regularly  Her hypertension is well-controlledOn hydrochlorothiazide losartan  Rarely needs her albuterol inhaler  Currently on Wegovy has lost approximately 30 pounds  Uses propranolol rarely for essential tremor  Depression well-controlled on Lexapro  Hypothyroidism controlled on levothyroxine  Uses alprazolam occasionally for sleep  She has a history of recurrent herpes oralis.  Taking valacyclovir prophylactically at request of Dr. Oh  Past surgical history had a facelift in 2019 has had bilateral hip replacements has had ORIF of her right elbow as well as her right wrist most recently had a right reverse shoulder replacement June 2023.  All surgeries were uneventful

## 2024-05-22 NOTE — PHYSICAL EXAM
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

## 2024-05-23 LAB
APPEARANCE: CLEAR
BASOPHILS # BLD AUTO: 0.05 K/UL
BASOPHILS NFR BLD AUTO: 0.6 %
BILIRUBIN URINE: NEGATIVE
BLOOD URINE: NEGATIVE
COLOR: YELLOW
EOSINOPHIL # BLD AUTO: 0.1 K/UL
EOSINOPHIL NFR BLD AUTO: 1.3 %
GLUCOSE QUALITATIVE U: NEGATIVE MG/DL
HCT VFR BLD CALC: 41 %
HGB BLD-MCNC: 13.5 G/DL
IMM GRANULOCYTES NFR BLD AUTO: 0.3 %
KETONES URINE: NEGATIVE MG/DL
LEUKOCYTE ESTERASE URINE: NEGATIVE
LYMPHOCYTES # BLD AUTO: 1.8 K/UL
LYMPHOCYTES NFR BLD AUTO: 23 %
MAN DIFF?: NORMAL
MCHC RBC-ENTMCNC: 31.3 PG
MCHC RBC-ENTMCNC: 32.9 GM/DL
MCV RBC AUTO: 94.9 FL
MONOCYTES # BLD AUTO: 0.91 K/UL
MONOCYTES NFR BLD AUTO: 11.6 %
NEUTROPHILS # BLD AUTO: 4.96 K/UL
NEUTROPHILS NFR BLD AUTO: 63.2 %
NITRITE URINE: NEGATIVE
PH URINE: 6.5
PLATELET # BLD AUTO: 343 K/UL
PROTEIN URINE: NEGATIVE MG/DL
RBC # BLD: 4.32 M/UL
RBC # FLD: 12.8 %
SPECIFIC GRAVITY URINE: 1.01
UROBILINOGEN URINE: 0.2 MG/DL
WBC # FLD AUTO: 7.84 K/UL

## 2024-06-25 ENCOUNTER — TRANSCRIPTION ENCOUNTER (OUTPATIENT)
Age: 74
End: 2024-06-25

## 2024-07-31 ENCOUNTER — TRANSCRIPTION ENCOUNTER (OUTPATIENT)
Age: 74
End: 2024-07-31

## 2024-08-06 ENCOUNTER — RX RENEWAL (OUTPATIENT)
Age: 74
End: 2024-08-06

## 2024-08-22 DIAGNOSIS — L98.9 DISORDER OF THE SKIN AND SUBCUTANEOUS TISSUE, UNSPECIFIED: ICD-10-CM

## 2024-08-27 ENCOUNTER — APPOINTMENT (OUTPATIENT)
Dept: DERMATOLOGY | Facility: CLINIC | Age: 74
End: 2024-08-27
Payer: COMMERCIAL

## 2024-08-27 DIAGNOSIS — L82.1 OTHER SEBORRHEIC KERATOSIS: ICD-10-CM

## 2024-08-27 DIAGNOSIS — L81.4 OTHER MELANIN HYPERPIGMENTATION: ICD-10-CM

## 2024-08-27 DIAGNOSIS — L57.0 ACTINIC KERATOSIS: ICD-10-CM

## 2024-08-27 PROCEDURE — 99204 OFFICE O/P NEW MOD 45 MIN: CPT

## 2024-08-27 RX ORDER — FLUOROURACIL 50 MG/G
5 CREAM TOPICAL
Qty: 1 | Refills: 1 | Status: ACTIVE | COMMUNITY
Start: 2024-08-27 | End: 1900-01-01

## 2024-08-27 NOTE — ASSESSMENT
[FreeTextEntry1] : Complete skin examination is negative for malignancy; Multiple new concerns were addressed and discussed. Therapeutic options and their risks and benefits; along with multiple diagnostic possibilities were discussed at length; risks and benefits of skin biopsy and/or other further study were discussed;  Discussed cosmetic removal of SKs briefly; s/p facelift from Dr. Prince;  5/2024  recurrent erosion on nose:  doubt BCC most likely eroded AK; Risks and benefits of topical actinic keratosis treatments were discussed including redness, scaling, discomfort crusting, oozing, and recurrence. 5FU x 2 weeks,  will do biopsy if continues to recur after treatment;

## 2024-08-27 NOTE — HISTORY OF PRESENT ILLNESS
[de-identified] : Pt. presents for skin check; c/o few spots of concern;  lesion on nose that flares intermittently, resolves with no residual lesion; Severity:  mild   Modifying factors:  none Associated symptoms:  none Context:  no association with activity

## 2024-08-27 NOTE — PHYSICAL EXAM
[Full Body Skin Exam Performed] : performed [FreeTextEntry3] : Skin examination performed of the face, neck, trunk, arms, legs;  The patient is well, alert and oriented, pleasant and cooperative. Eyelids, conjunctivae, oral mucosa, digits and nails all normal.   No cervical adenopathy.  Normal findings include:  Seborrheic keratoses- few on face- forehead, L cheek Angiomas + lentigines and solar damage are present in sun exposed areas;   nose dorsum, R of midline;  + 8mm erosion without visible neoplasm;   No lesions were suspicious for malignancy.

## 2024-09-05 RX ORDER — BUPROPION HYDROCHLORIDE 150 MG/1
150 TABLET, EXTENDED RELEASE ORAL DAILY
Qty: 60 | Refills: 1 | Status: ACTIVE | COMMUNITY
Start: 2024-09-05 | End: 1900-01-01

## 2024-09-10 ENCOUNTER — TRANSCRIPTION ENCOUNTER (OUTPATIENT)
Age: 74
End: 2024-09-10

## 2024-09-13 DIAGNOSIS — R05.3 CHRONIC COUGH: ICD-10-CM

## 2024-09-17 ENCOUNTER — APPOINTMENT (OUTPATIENT)
Dept: RADIOLOGY | Facility: CLINIC | Age: 74
End: 2024-09-17
Payer: COMMERCIAL

## 2024-09-17 ENCOUNTER — APPOINTMENT (OUTPATIENT)
Dept: MAMMOGRAPHY | Facility: CLINIC | Age: 74
End: 2024-09-17
Payer: COMMERCIAL

## 2024-09-17 ENCOUNTER — RESULT REVIEW (OUTPATIENT)
Age: 74
End: 2024-09-17

## 2024-09-17 ENCOUNTER — OUTPATIENT (OUTPATIENT)
Dept: OUTPATIENT SERVICES | Facility: HOSPITAL | Age: 74
LOS: 1 days | End: 2024-09-17
Payer: COMMERCIAL

## 2024-09-17 DIAGNOSIS — Z00.00 ENCOUNTER FOR GENERAL ADULT MEDICAL EXAMINATION WITHOUT ABNORMAL FINDINGS: ICD-10-CM

## 2024-09-17 PROCEDURE — 71046 X-RAY EXAM CHEST 2 VIEWS: CPT | Mod: 26

## 2024-09-17 PROCEDURE — 77067 SCR MAMMO BI INCL CAD: CPT | Mod: 26

## 2024-09-17 PROCEDURE — 77063 BREAST TOMOSYNTHESIS BI: CPT | Mod: 26

## 2024-09-17 PROCEDURE — 77067 SCR MAMMO BI INCL CAD: CPT

## 2024-09-17 PROCEDURE — 77063 BREAST TOMOSYNTHESIS BI: CPT

## 2024-09-17 PROCEDURE — 71046 X-RAY EXAM CHEST 2 VIEWS: CPT

## 2024-10-01 ENCOUNTER — RX RENEWAL (OUTPATIENT)
Age: 74
End: 2024-10-01

## 2024-10-24 ENCOUNTER — TRANSCRIPTION ENCOUNTER (OUTPATIENT)
Age: 74
End: 2024-10-24

## 2024-11-05 DIAGNOSIS — H10.9 UNSPECIFIED CONJUNCTIVITIS: ICD-10-CM

## 2024-11-05 RX ORDER — CIPROFLOXACIN 3 MG/ML
0.3 SOLUTION OPHTHALMIC
Qty: 1 | Refills: 1 | Status: ACTIVE | COMMUNITY
Start: 2024-11-05 | End: 1900-01-01

## 2024-11-11 DIAGNOSIS — L81.9 DISORDER OF PIGMENTATION, UNSPECIFIED: ICD-10-CM

## 2024-11-11 RX ORDER — HYDROQUINONE 40 MG/G
4 CREAM TOPICAL TWICE DAILY
Qty: 1 | Refills: 1 | Status: ACTIVE | COMMUNITY
Start: 2024-11-11 | End: 1900-01-01

## 2024-11-12 DIAGNOSIS — E66.9 OBESITY, UNSPECIFIED: ICD-10-CM

## 2024-12-09 DIAGNOSIS — M17.0 BILATERAL PRIMARY OSTEOARTHRITIS OF KNEE: ICD-10-CM

## 2024-12-09 RX ORDER — DICLOFENAC SODIUM 3 G/100G
3 GEL TOPICAL
Qty: 1 | Refills: 5 | Status: ACTIVE | COMMUNITY
Start: 2024-12-09 | End: 1900-01-01

## 2024-12-10 DIAGNOSIS — E66.9 OBESITY, UNSPECIFIED: ICD-10-CM

## 2024-12-10 RX ORDER — TIRZEPATIDE 10 MG/.5ML
10 INJECTION, SOLUTION SUBCUTANEOUS
Qty: 3 | Refills: 1 | Status: ACTIVE | COMMUNITY
Start: 2024-12-10 | End: 1900-01-01

## 2024-12-19 ENCOUNTER — APPOINTMENT (OUTPATIENT)
Dept: MRI IMAGING | Facility: CLINIC | Age: 74
End: 2024-12-19
Payer: COMMERCIAL

## 2024-12-19 ENCOUNTER — OUTPATIENT (OUTPATIENT)
Dept: OUTPATIENT SERVICES | Facility: HOSPITAL | Age: 74
LOS: 1 days | End: 2024-12-19
Payer: COMMERCIAL

## 2024-12-19 DIAGNOSIS — M17.0 BILATERAL PRIMARY OSTEOARTHRITIS OF KNEE: ICD-10-CM

## 2024-12-19 DIAGNOSIS — Z00.8 ENCOUNTER FOR OTHER GENERAL EXAMINATION: ICD-10-CM

## 2024-12-19 PROCEDURE — 73721 MRI JNT OF LWR EXTRE W/O DYE: CPT | Mod: 26,RT

## 2024-12-19 PROCEDURE — 73721 MRI JNT OF LWR EXTRE W/O DYE: CPT

## 2025-04-04 NOTE — RESULTS/DATA
-- DO NOT REPLY / DO NOT REPLY ALL --  -- This inbox is not monitored. If this was sent to the wrong provider or department, reroute message to [facility] [place] [facility] pool. --  -- Message is from [facility] ([facility]) --  Reason for Appointment Message: Caller wants sooner Labs Follow up appointment - all locations with clinician were offered    Reason for Visit: Follow up visit to discuss labs    Is the patient currently scheduled? No    Preferred time to be seen: PCP schedule is not released. Patient would like to be seen late June or early July.    Caller Information       Contact Date/Time Type Contact Phone/Fax    04/04/2025 03:39 PM CDT Phone (Incoming) Michelle [phone]            Alternative phone number: na    Can a detailed message be left?  Yes - Voicemail   Patient has been advised the message will be addressed within 2-3 business days         Copied from CRM #[id number]. Topic:  Schedule Appointment  >> Apr 4, 2025  3:40 PM Ashish SMITH wrote:  Michelle called to schedule, cancel or reschedule a Primary Care or Specialty Clinician visit.   Unable to Schedule, reschedule, or cancel, Sent message - Follow up.   [de-identified] : TSH, A1c normal  cholesterol 232   9/23

## 2025-04-11 DIAGNOSIS — M43.02 SPONDYLOLYSIS, CERVICAL REGION: ICD-10-CM

## 2025-04-25 ENCOUNTER — NON-APPOINTMENT (OUTPATIENT)
Age: 75
End: 2025-04-25

## 2025-07-18 ENCOUNTER — TRANSCRIPTION ENCOUNTER (OUTPATIENT)
Age: 75
End: 2025-07-18

## 2025-08-07 DIAGNOSIS — H60.90 UNSPECIFIED OTITIS EXTERNA, UNSPECIFIED EAR: ICD-10-CM

## 2025-08-07 RX ORDER — COLISTIN SULFATE, NEOMYCIN SULFATE, THONZONIUM BROMIDE AND HYDROCORTISONE ACETATE 3; 3.3; .5; 1 MG/ML; MG/ML; MG/ML; MG/ML
3.3-3-10-0.5 SUSPENSION AURICULAR (OTIC)
Qty: 1 | Refills: 1 | Status: ACTIVE | COMMUNITY
Start: 2025-08-07 | End: 1900-01-01